# Patient Record
Sex: MALE | Race: BLACK OR AFRICAN AMERICAN | Employment: FULL TIME | URBAN - METROPOLITAN AREA
[De-identification: names, ages, dates, MRNs, and addresses within clinical notes are randomized per-mention and may not be internally consistent; named-entity substitution may affect disease eponyms.]

---

## 2017-07-13 RX ORDER — ATROPINE SULFATE 0.1 MG/ML
0.5 INJECTION INTRAVENOUS
Status: CANCELLED | OUTPATIENT
Start: 2017-07-13 | End: 2017-07-14

## 2017-07-13 RX ORDER — NALOXONE HYDROCHLORIDE 0.4 MG/ML
0.4 INJECTION, SOLUTION INTRAMUSCULAR; INTRAVENOUS; SUBCUTANEOUS
Status: CANCELLED | OUTPATIENT
Start: 2017-07-13 | End: 2017-07-14

## 2017-07-13 RX ORDER — DEXTROMETHORPHAN/PSEUDOEPHED 2.5-7.5/.8
1.2 DROPS ORAL
Status: CANCELLED | OUTPATIENT
Start: 2017-07-13

## 2017-07-13 RX ORDER — SODIUM CHLORIDE 0.9 % (FLUSH) 0.9 %
5-10 SYRINGE (ML) INJECTION AS NEEDED
Status: CANCELLED | OUTPATIENT
Start: 2017-07-13 | End: 2017-07-14

## 2017-07-13 RX ORDER — SODIUM CHLORIDE 0.9 % (FLUSH) 0.9 %
5-10 SYRINGE (ML) INJECTION EVERY 8 HOURS
Status: CANCELLED | OUTPATIENT
Start: 2017-07-13 | End: 2017-07-14

## 2017-07-13 RX ORDER — EPINEPHRINE 0.1 MG/ML
1 INJECTION INTRACARDIAC; INTRAVENOUS
Status: CANCELLED | OUTPATIENT
Start: 2017-07-13 | End: 2017-07-14

## 2017-07-14 ENCOUNTER — HOSPITAL ENCOUNTER (OUTPATIENT)
Age: 62
Setting detail: OUTPATIENT SURGERY
Discharge: HOME | End: 2017-07-14
Attending: SURGERY | Admitting: SURGERY
Payer: COMMERCIAL

## 2017-07-14 VITALS
RESPIRATION RATE: 16 BRPM | OXYGEN SATURATION: 99 % | TEMPERATURE: 95.9 F | HEART RATE: 62 BPM | BODY MASS INDEX: 25.05 KG/M2 | WEIGHT: 175 LBS | HEIGHT: 70 IN | SYSTOLIC BLOOD PRESSURE: 140 MMHG | DIASTOLIC BLOOD PRESSURE: 98 MMHG

## 2017-07-14 PROCEDURE — 77030020263 HC SOL INJ SOD CL0.9% LFCR 1000ML: Performed by: SURGERY

## 2017-07-14 PROCEDURE — 74011250636 HC RX REV CODE- 250/636

## 2017-07-14 PROCEDURE — 76040000019: Performed by: SURGERY

## 2017-07-14 PROCEDURE — 77030009426 HC FCPS BIOP ENDOSC BSC -B: Performed by: SURGERY

## 2017-07-14 PROCEDURE — 99152 MOD SED SAME PHYS/QHP 5/>YRS: CPT | Performed by: SURGERY

## 2017-07-14 PROCEDURE — 74011250636 HC RX REV CODE- 250/636: Performed by: SURGERY

## 2017-07-14 RX ORDER — FENTANYL CITRATE 50 UG/ML
100 INJECTION, SOLUTION INTRAMUSCULAR; INTRAVENOUS
Status: DISCONTINUED | OUTPATIENT
Start: 2017-07-14 | End: 2017-07-14 | Stop reason: HOSPADM

## 2017-07-14 RX ORDER — LISINOPRIL 40 MG/1
40 TABLET ORAL DAILY
COMMUNITY

## 2017-07-14 RX ORDER — SODIUM CHLORIDE 9 MG/ML
125 INJECTION, SOLUTION INTRAVENOUS CONTINUOUS
Status: DISCONTINUED | OUTPATIENT
Start: 2017-07-14 | End: 2017-07-14 | Stop reason: HOSPADM

## 2017-07-14 RX ORDER — MIDAZOLAM HYDROCHLORIDE 1 MG/ML
5 INJECTION, SOLUTION INTRAMUSCULAR; INTRAVENOUS
Status: DISCONTINUED | OUTPATIENT
Start: 2017-07-14 | End: 2017-07-14 | Stop reason: HOSPADM

## 2017-07-14 RX ORDER — LIDOCAINE HYDROCHLORIDE 20 MG/ML
5 SOLUTION OROPHARYNGEAL ONCE
Status: DISCONTINUED | OUTPATIENT
Start: 2017-07-14 | End: 2017-07-14 | Stop reason: HOSPADM

## 2017-07-14 RX ORDER — LISINOPRIL 10 MG/1
10 TABLET ORAL DAILY
Status: ON HOLD | COMMUNITY
End: 2017-07-14

## 2017-07-14 RX ORDER — ATORVASTATIN CALCIUM 20 MG/1
20 TABLET, FILM COATED ORAL DAILY
COMMUNITY

## 2017-07-14 RX ORDER — FLUMAZENIL 0.1 MG/ML
0.2 INJECTION INTRAVENOUS
Status: DISCONTINUED | OUTPATIENT
Start: 2017-07-14 | End: 2017-07-14 | Stop reason: HOSPADM

## 2017-07-14 NOTE — INTERVAL H&P NOTE
H&P Update:  Mariana Trinidad was seen and examined. History and physical has been reviewed. The patient has been examined. There have been no significant clinical changes since the completion of the originally dated History and Physical.  Patient identified by surgeon; surgical site was confirmed by patient and surgeon.     Signed By: Kenya Rod DO     July 14, 2017 8:41 AM

## 2017-07-14 NOTE — PROCEDURES
Colonoscopy Procedure Note    Indications: Screening for colon cancer    Anesthesia/Sedation: MAC anesthesia    Pre-Procedure Exam:  Airway: clear   Heart: normal S1and S2    Lungs: clear bilateral  Abdomen: soft, nontender, bowel sounds present and normal in all quadrants   Mental Status: awake, alert, and oriented to person, place, and time      Procedure in Detail:  Informed consent was obtained for the procedure, including sedation. Risks of perforation, hemorrhage, adverse drug reaction, and aspiration were discussed. The patient was placed in the left lateral decubitus position. Based on the pre-procedure assessment, including review of the patient's medical history, medications, allergies, and review of systems, he had been deemed to be an appropriate candidate for moderate sedation; he was therefore sedated with the medications listed above. The patient was monitored continuously with ECG tracing, pulse oximetry, blood pressure monitoring, and direct observations. A digital rectal examination was performed. The [id number] was inserted into the rectum and advanced under direct vision to the cecum, which was identified by the ileocecal valve and appendiceal orifice. The quality of the colonic preparation was good. A careful inspection of the mucosa was made as the colonoscope was withdrawn, including a careful and complete straight view of the rectum and anorectal junction; findings and interventions are described below. Appropriate photodocumentation was obtained. Findings:   ANUS: Anal exam reveals no masses or hemorrhoids, sphincter tone is normal.   RECTUM: Rectal exam reveals no masses. Internal bleeding hemorrhoids noted. SIGMOID COLON: The mucosa is normal with good vascular pattern and without ulcers, diverticula, and polyps. DESCENDING COLON: The mucosa is normal with good vascular pattern and without ulcers, diverticula, and polyps.    SPLENIC FLEXURE: The splenic flexure is normal. TRANSVERSE COLON: The mucosa is normal with good vascular pattern and without ulcers, diverticula, and polyps. HEPATIC FLEXURE: The hepatic flexure is normal.   ASCENDING COLON: The mucosa is normal with good vascular pattern and without ulcers, diverticula, and polyps. CECUM: The appendiceal orifice appears normal. The ileocecal valve appears normal.   TERMINAL ILEUM: The terminal ileum was not entered. Specimens: No specimens were collected. EBL: None    Complications: None; patient tolerated the procedure well. Recommendations:   - For colon cancer screening in this average-risk patient, colonoscopy may be repeated in 10 years. - Follow up with me. - For rubber band ligation of bleeding internal hemorrhoids    Signed By: DR. Ernestine Angel, [address] [facility]                       7/14/2017

## 2017-07-14 NOTE — H&P
Assessment/Plan  # Detail Type Description    1. Assessment Encounter for screening for cancer of colon (Z12.11). Impression Patient is asymptomatic and due for a screening colonoscopy. .    Patient Plan  Pt is an average risk patient presenting for colonoscopy and colon cancer screening. Pt risk was determined based on NCCN guidelines V1.2016. Risks benefits and alternatives of colonoscopy were discussed. Risk of perforation, bleeding or gas pain, missed polyps, inability to complete full colonoscopy, anesthesia complications discussed. Expectations of preoperative bowel prep /procedure described. Literature was given to the patient and reviewed. The patient showed understanding and agreed with the plan as above. Extended time was taken to answer all patient questions. Extra pre-procedure paperwork and counseling completed. Moviprep prescription and directions provided. Pt has been educated on the Moviprep. Pt has been instructed that he is not to eat legumes, nuts, seeds or vegetables that are high in cellulose one week prior to the planned procedure. Pt should avoid red, purple and orange liquids, janak, and juices. Pt is to be on a clear liquid diet 24 hours prior to the procedure. Pt will start the Moviprep at 1800 hours the night prior to the procedure with the second portion of the prep to be taken 6 hours prior to time of the appointment. This 62year old male presents for Colon Cancer Screen. History of Present Illness:  1. Colon Cancer Screen   Prior screening:  colonoscopy and 2007. Denies risk factors. Associated symptoms include constipation, rectal bleeding and bleeding from hemorrhoids. Pertinent negatives include abdominal pain, diarrhea, nausea, vomiting and weight loss. Additional information: No family history of colon cancer, No family history of Crohn's/colitis and NSAID/ASA use. No CAMRON, no anesthesia problems, prior abd surgeries, no blood thinners. prior fissure surgery -unknown type per patient. No smoker            PROBLEM LIST:  Problem Description Onset Date   Essential hypertension 2011   Pure hypercholesterolemia 2011   Gastroesophageal reflux disease 2011   Proteinuria 2011   Hypogonadism 2015       PAST MEDICAL/SURGICAL HISTORY  (Detailed)    Disease/disorder Onset Date Management Date Comments   Achilles Fixture 2014      Cataracts 2014        Family History:  (Detailed)  Relationship Family Member Name  Age at Death Condition Onset Age Cause of Death   Father  N  Hypertension  N   Mother  N  aortic aneurysm  N       Social History:  (Detailed)  Tobacco use reviewed. Preferred language is [place]. The patient does not need an . MARITAL STATUS/FAMILY/SOCIAL SUPPORT  Currently . CHILDREN  Has children:  1 son(s). 2 daughter(s). Tobacco use status: Never smoked tobacco.  Smoking status: Never smoker. SMOKING STATUS  Use Status Type Smoking Status Usage Per Day Years Used Total Pack Years   no/never  Never smoker          ALCOHOL  There is a history of alcohol use. Type: Beer and liquor. 6 pk of beer consumed weekly. CAFFEINE  The patient does not use caffeine. Patient Status   Completed with information received for patient transitioning into care. Completed with information received for patient in a summary of care record. Medication Reconciliation  Medications reconciled today.   Medication Reviewed  Adherence Medication Name Sig Desc Elsewhere Status   taking as directed Vitamin D 1,000 unit Cap take 2 qd N Verified   taking as directed Naprosyn 500 mg tablet take 1 tablet by oral route 2 times every day with food N Verified   taking as directed ranitidine 150 mg tablet take 1 tablet (150MG)  by oral route 2 times every day N Verified   taking as directed lisinopril 40 mg tablet take 1 tablet (40MG)  by oral route  every day N Verified   taking as directed Lipitor 20 mg tablet take 1 tablet (20MG)  by oral route  every day N Verified   taking as directed Anusol-HC 2.5 % topical cream apply by topical route 2 times every day to the affected area(s) N Verified   taking as directed Axiron 30 mg/actuation (1.5 mL) transderm solution in metered pump apply 1 pump by topical route  every day in the morning to each underarm for a total dose of 60 mg N Verified     Allergies:  Ingredient Reaction Medication Name Comment   CIPROFLOXACIN HCL hives, dyspnea  Cipro   LEVOFLOXACIN hives, dyspnea  Levaquin   CIPROFLOXACIN hives, dyspnea  Cipro   (Reviewed, no changes.)  Review of Systems  System Neg/Pos Details   Constitutional Negative Fever, night sweats and weight loss. ENMT Negative Hearing loss, tinnitus, vertigo and voice change. Eyes Negative Diplopia and vision loss. Respiratory Negative Asthma, cough, dyspnea, hemoptysis, known TB exposure and wheezing. Cardio Negative Chest pain, claudication, edema, irregular heartbeat/palpitations and thrombophlebitis. GI Positive Constipation, Rectal bleeding. GI Negative Abdominal pain, bloating, diarrhea, dysphagia, hemorrhoids, jaundice, nausea, reflux and vomiting.  Negative Dysuria, nocturia, passage stone/gravel and urinary incontinence. Endocrine Negative Cold intolerance and goiter. Neuro Negative Focal weakness, headache, paresthesia, seizures and syncope. Integumentary Negative Change in shape/size of mole(s) and skin lesion. MS Negative Back pain, bone/joint symptoms and muscle weakness. Hema/Lymph Negative Easy bleeding and easy bruising. Allergic/Immuno Negative Contact allergy and contact dermatitis.        Vital Signs     Height  Time ft in cm Last Measured Height Position   1:12 PM 5.0 9.00 175.26 03/29/2017 0     Weight/BSA/BMI  Time lb oz kg Context BMI kg/m2 BSA m2   1:12 .00  82.554 dressed with shoes 26.88      Blood Pressure  Time BP mm/Hg Position Side Site Method Cuff Size   1:12 /97 sitting right arm automatic adult     Temperature/Pulse/Respiration  Time Temp F Temp C Temp Site Pulse/min Pattern Resp/ min   1:12 PM 98.30 36.83 ear 76 regular      Measured By  Time Measured by   1:12 PM Philip Bishop       Physical Exam:  Exam Findings Details   Constitutional Normal Well developed. Eyes Normal Conjunctiva - Right: Normal, Left: Normal. Pupil - Right: Normal, Left: Normal.   Ears Normal Inspection - Right: Normal, Left: Normal.   Nose/Mouth/Throat Normal External nose - Normal. Lips/teeth/gums - Normal.   Neck Exam Normal Inspection - Normal. Thyroid gland - Normal.   Respiratory Normal Inspection - Normal. Auscultation - Normal. Effort - Normal.   Vascular Normal Capillary refill - Less than 2 seconds. Abdomen Normal Inspection - Normal. Auscultation - Normal. No abdominal tenderness. No hepatic enlargement. No spleen enlargement. No hernia. Genitourinary Normal No hernia. Rectal Normal Fecal occult blood test - Not indicated. Skin Normal Inspection - Normal.   Musculoskeletal Normal Visual overview of all four extremities is normal.   Extremity Normal No edema. Neurological Normal Memory - Normal. Cranial nerves - Cranial nerves II through XII grossly intact. Psychiatric Normal Orientation - Oriented to time, place, person & situation. Appropriate mood and affect. Normal insight. Normal judgment.          Medications (added, continued, or stopped this visit):  Started Medication Directions Instruction Stopped   03/07/2017 Anusol-HC 2.5 % topical cream apply by topical route 2 times every day to the affected area(s)     03/07/2017 Axiron 30 mg/actuation (1.5 mL) transderm solution in metered pump apply 1 pump by topical route  every day in the morning to each underarm for a total dose of 60 mg dx hypogonadism    03/07/2017 Lipitor 20 mg tablet take 1 tablet (20MG)  by oral route  every day     03/07/2017 lisinopril 40 mg tablet take 1 tablet (40MG)  by oral route  every day     03/07/2017 Naprosyn 500 mg tablet take 1 tablet by oral route 2 times every day with food     03/07/2017 ranitidine 150 mg tablet take 1 tablet (150MG)  by oral route 2 times every day     02/15/2011 Vitamin D 1,000 unit Cap take 2 qd       Counseling / Educational Factors:  Readiness to learn: accepting. No barriers to learning identified. No learning preferences specified. This is a visit of 45 minutes.  25 minutes were spent counseling.minutes were spent counseling.  -----------------------------------------------------------------------------------------------------

## 2017-07-14 NOTE — IP AVS SNAPSHOT
[place] [address] [address] 
[phone] Patient: Kerry Serrato MRN: [id number] :1955 You are allergic to the following Allergen Reactions Levaquin (Levofloxacin) Anaphylaxis Recent Documentation Height Weight BMI Smoking Status 1.778 m 79.4 kg 25.11 kg/m2 Never Smoker Emergency Contacts Name Discharge Info Relation Home Work Mobile [address] [facility],   [phone] About your hospitalization You were admitted on:  2017 You last received care in the:  [facility] ENDOSCOPY You were discharged on:  2017 Unit phone number:  [phone] Why you were hospitalized Your primary diagnosis was:  Not on File Providers Seen During Your Hospitalizations Provider Role Specialty Primary office phone Rosie Kussmaul, DO Attending Provider Colon and Rectal Surgery [phone] Your Primary Care Physician (PCP) Primary Care Physician Office Phone Office Fax Jamison Pamela [phone] Follow-up Information Follow up With Details Comments Contact Info Rosie Kussmaul, DO  for surgical treatment of hemorrhoids [address] [place] [address] 
[phone] Current Discharge Medication List  
  
CONTINUE these medications which have NOT CHANGED Dose & Instructions Dispensing Information Comments Morning Noon Evening Bedtime LIPITOR 20 mg tablet Generic drug:  atorvastatin Your last dose was: Your next dose is:    
   
   
 Dose:  20 mg Take 20 mg by mouth daily. Refills:  0  
     
   
   
   
  
 lisinopril 40 mg tablet Commonly known as:  Ross Peek Your last dose was: Your next dose is:    
   
   
 Dose:  40 mg Take 40 mg by mouth daily. Refills:  0 Discharge Instructions Steven November [id number] 
1955 COLON DISCHARGE INSTRUCTIONS Discomfort: 
Redness at IV site- apply warm compress to area; if redness or soreness persist- contact your physician There may be a slight amount of blood passed from the rectum Gaseous discomfort- walking, belching will help relieve any discomfort You should not operate a vehicle for 12 hours You should not engage in an occupation involving machinery or appliances for rest of today You may not drink alcoholic beverages for at least 12 hours Avoid making any critical decisions for at least 24 hour DIET: 
 Regular or resume diet normally appropriate for you.  however -  remember your colon is empty and a heavy meal will produce gas. Avoid these foods:  vegetables, fried / greasy foods, carbonated drinks for today ACTIVITY: 
You may resume your normal daily activities it is recommended that you spend the remainder of the day resting -  avoid any strenuous activity. CALL M.D. ANY SIGN OF: Increasing pain, nausea, vomiting Abdominal distension (swelling) New increased bleeding (oral or rectal) Fever (chills) Pain in chest area Bloody discharge from nose or mouth Shortness of breath Follow-up Instructions: 
 If biopsies were taken, please call Dr. Syd Jasso office in next 2 weeks, [phone]. Otherwise follow up with your PCM. Steven November [id number] 
1955 DISCHARGE SUMMARY from Nurse The following personal items collected during your admission are returned to you:  
Dental Appliance:   
Vision:   
Hearing Aid:   
Jewelry:   
Clothing:   
Other Valuables:   
Valuables sent to safe:   
 
 
 
DISCHARGE SUMMARY from Nurse The following personal items collected during your admission are returned to you:  
Dental Appliance: Dental Appliances: None Vision: Visual Aid: Glasses (reading) Hearing Aid:   
Jewelry:   
Clothing:   
Other Valuables: Valuables sent to safe:   
 
 
 
 
 
PATIENT INSTRUCTIONS: 
 
 
F-face looks uneven A-arms unable to move or move unevenly S-speech slurred or non-existent T-time-call 911 as soon as signs and symptoms begin-DO NOT go Back to bed or wait to see if you get better-TIME IS BRAIN. The discharge information has been reviewed with the patient and spouse. The patient and spouse verbalized understanding. Warning Signs of HEART ATTACK Call 911 if you have these symptoms: 
? Chest discomfort. Most heart attacks involve discomfort in the center of the chest that lasts more than a few minutes, or that goes away and comes back. It can feel like uncomfortable pressure, squeezing, fullness, or pain. ? Discomfort in other areas of the upper body. Symptoms can include pain or discomfort in one or both arms, the back, neck, jaw, or stomach. ? Shortness of breath with or without chest discomfort. ? Other signs may include breaking out in a cold sweat, nausea, or lightheadedness. Don't wait more than five minutes to call [address]! Fast action can save your life. Calling 911 is almost always the fastest way to get lifesaving treatment. Emergency Medical Services staff can begin treatment when they arrive  up to an hour sooner than if someone gets to the hospital by car. The discharge information has been reviewed with the patient and caregiver. The patient and caregiver verbalized understanding. Discharge medications reviewed with the patient and guardian and appropriate educational materials and side effects teaching were provided. Patient armband removed and shredded Discharge Instructions Attachments/References COLONOSCOPY : POST-OP (ENGLISH) HEMORRHOIDS (ENGLISH) HEMORRHOIDS: RUBBER BAND LIGATION: PRE-OP (ENGLISH) Discharge Orders None Introducing [facility] SERVICES! Elle Rangel introduces [employer] patient portal. Now you can access parts of your medical record, email your doctor's office, and request medication refills online. 1. In your internet browser, go to [email or web address] 2. Click on the First Time User? Click Here link in the Sign In box. You will see the New Member Sign Up page. 3. Enter your [employer] Access Code exactly as it appears below. You will not need to use this code after youve completed the sign-up process. If you do not sign up before the expiration date, you must request a new code. · [employer] Access Code: [email or web address] Expires: 9/26/2017  9:18 AM 
 
4. Enter the last four digits of your Social Security Number (xxxx) and Date of Birth (mm/dd/yyyy) as indicated and click Submit. You will be taken to the next sign-up page. 5. Create a [employer] ID. This will be your [employer] login ID and cannot be changed, so think of one that is secure and easy to remember. 6. Create a [employer] password. You can change your password at any time. 7. Enter your Password Reset Question and Answer. This can be used at a later time if you forget your password. 8. Enter your e-mail address. You will receive e-mail notification when new information is available in [address] 9. Click Sign Up. You can now view and download portions of your medical record. 10. Click the Download Summary menu link to download a portable copy of your medical information. If you have questions, please visit the Frequently Asked Questions section of the [employer] website. Remember, [employer] is NOT to be used for urgent needs. For medical emergencies, dial 911. Now available from your iPhone and Android! General Information Please provide this summary of care documentation to your next provider. Patient Signature:  ____________________________________________________________ Date:  ____________________________________________________________  
  
Kely Maryann Provider Signature:  ____________________________________________________________ Date:  ____________________________________________________________ More Information Colonoscopy: What to Expect at [facility] Your Recovery After you have a colonoscopy, you will stay at the clinic for 1 to 2 hours until the medicines wear off. Then you can go home. But you will need to arrange for a ride. Your doctor will tell you when you can eat and do your other usual activities. Your doctor will talk to you about when you will need your next colonoscopy. Your doctor can help you decide how often you need to be checked. This will depend on the results of your test and your risk for colorectal cancer. After the test, you may be bloated or have gas pains. You may need to pass gas. If a biopsy was done or a polyp was removed, you may have streaks of blood in your stool (feces) for a few days. This care sheet gives you a general idea about how long it will take for you to recover. But each person recovers at a different pace. Follow the steps below to get better as quickly as possible. How can you care for yourself at home? Activity · Rest when you feel tired. · You can do your normal activities when it feels okay to do so. Diet · Follow your doctor's directions for eating. · Unless your doctor has told you not to, drink plenty of fluids. This helps to replace the fluids that were lost during the colon prep. · Do not drink alcohol. Medicines · Your doctor will tell you if and when you can restart your medicines. He or she will also give you instructions about taking any new medicines. · If you take blood thinners, such as warfarin (Coumadin), clopidogrel (Plavix), or aspirin, be sure to talk to your doctor. He or she will tell you if and when to start taking those medicines again. Make sure that you understand exactly what your doctor wants you to do. · If polyps were removed or a biopsy was done during the test, your doctor may tell you not to take aspirin or other anti-inflammatory medicines for a few days. These include ibuprofen (Advil, Motrin) and naproxen (Aleve). Other instructions · For your safety, do not drive or operate machinery until the medicine wears off and you can think clearly. Your doctor may tell you not to drive or operate machinery until the day after your test. 
· Do not sign legal documents or make major decisions until the medicine wears off and you can think clearly. The anesthesia can make it hard for you to fully understand what you are agreeing to. Follow-up care is a key part of your treatment and safety. Be sure to make and go to all appointments, and call your doctor if you are having problems. It's also a good idea to know your test results and keep a list of the medicines you take. When should you call for help? Call 911 anytime you think you may need emergency care. For example, call if: 
· You passed out (lost consciousness). · You pass maroon or bloody stools. · You have severe belly pain. Call your doctor now or seek immediate medical care if: 
· Your stools are black and tarlike. · Your stools have streaks of blood, but you did not have a biopsy or any polyps removed. · You have belly pain, or your belly is swollen and firm. · You vomit. · You have a fever. · You are very dizzy. Watch closely for changes in your health, and be sure to contact your doctor if you have any problems. Where can you learn more? Go to http://keerthi-clara.info/.  
Enter E264 in the search box to learn more about \"Colonoscopy: What to Expect at Home. \" Current as of: August 9, 2016 Content Version: 11.3 © 5737-9544 [employer]. Care instructions adapted under license by [employer] (which disclaims liability or warranty for this information). If you have questions about a medical condition or this instruction, always ask your healthcare professional. [address] any warranty or liability for your use of this information. Hemorrhoids: Care Instructions Your Care Instructions Hemorrhoids are enlarged veins that develop in the anal canal. Bleeding during bowel movements, itching, swelling, and rectal pain are the most common symptoms. They can be uncomfortable at times, but hemorrhoids rarely are a serious problem. You can treat most hemorrhoids with simple changes to your diet and bowel habits. These changes include eating more fiber and not straining to pass stools. Most hemorrhoids do not need surgery or other treatment unless they are very large and painful or bleed a lot. Follow-up care is a key part of your treatment and safety. Be sure to make and go to all appointments, and call your doctor if you are having problems. Its also a good idea to know your test results and keep a list of the medicines you take. How can you care for yourself at home? · Sit in a few inches of warm water (sitz bath) 3 times a day and after bowel movements. The warm water helps with pain and itching. · Put ice on your anal area several times a day for 10 minutes at a time. Put a thin cloth between the ice and your skin. Follow this by placing a warm, wet towel on the area for another 10 to 20 minutes. · Take pain medicines exactly as directed. ¨ If the doctor gave you a prescription medicine for pain, take it as prescribed. ¨ If you are not taking a prescription pain medicine, ask your doctor if you can take an over-the-counter medicine. · Keep the anal area clean, but be gentle. Use water and a fragrance-free soap, such as [place], or use baby wipes or medicated pads, such as Tucks. · Wear cotton underwear and loose clothing to decrease moisture in the anal area. · Eat more fiber. Include foods such as whole-grain breads and cereals, raw vegetables, raw and dried fruits, and beans. · Drink plenty of fluids, enough so that your urine is light yellow or clear like water. If you have kidney, heart, or liver disease and have to limit fluids, talk with your doctor before you increase the amount of fluids you drink. · Use a stool softener that contains bran or psyllium. You can save money by buying bran or psyllium (available in bulk at most health food stores) and sprinkling it on foods or stirring it into fruit juice. Or you can use a product such as Metamucil or Hydrocil. · Practice healthy bowel habits. ¨ Go to the bathroom as soon as you have the urge. ¨ Avoid straining to pass stools. Relax and give yourself time to let things happen naturally. ¨ Do not hold your breath while passing stools. ¨ Do not read while sitting on the toilet. Get off the toilet as soon as you have finished. · Take your medicines exactly as prescribed. Call your doctor if you think you are having a problem with your medicine. When should you call for help? Call 911 anytime you think you may need emergency care. For example, call if: 
· You pass maroon or very bloody stools. Call your doctor now or seek immediate medical care if: 
· You have increased pain. · You have increased bleeding. Watch closely for changes in your health, and be sure to contact your doctor if: 
· Your symptoms have not improved after 3 or 4 days. Where can you learn more? Go to http://keerthi-clara.info/. Enter F228 in the search box to learn more about \"Hemorrhoids: Care Instructions. \" Current as of: August 9, 2016 Content Version: 11.3 © 5130-1529 [employer]. Care instructions adapted under license by [employer] (which disclaims liability or warranty for this information). If you have questions about a medical condition or this instruction, always ask your healthcare professional. [address] any warranty or liability for your use of this information. Rubber Band Ligation for Hemorrhoids: Before Your Procedure What is rubber band ligation? Rubber band ligation treats hemorrhoids. Hemorrhoids are swollen veins in the rectal area. This treatment is for only internal hemorrhoids. To do the procedure, your doctor puts a special viewing tool into your anus. This tool is called an anoscope. The doctor then uses other tools to grab the hemorrhoid and put a rubber band around it. The band stops the blood flow. This causes the hemorrhoids to shrink and fall off in 7 to 10 days. In most cases, this procedure is done in the doctor's office. Your doctor can treat one or two hemorrhoids at a time. More hemorrhoids can be treated if you are asleep during the procedure. The procedure takes about 30 minutes. You can go home when it's done. Some people are able to return to regular activities right away. Others may need to take a few days off from work. Make sure not to lift anything heavy until you heal. It's also important not to strain when you have a bowel movement. Follow-up care is a key part of your treatment and safety. Be sure to make and go to all appointments, and call your doctor if you are having problems. It's also a good idea to know your test results and keep a list of the medicines you take. What happens before the procedure? Procedures can be stressful. This information will help you understand what you can expect. And it will help you safely prepare for your procedure. Preparing for the procedure · Understand exactly what procedure is planned, along with the risks, benefits, and other options. · Tell your doctors ALL the medicines, vitamins, supplements, and herbal remedies you take. Some of these can increase the risk of bleeding or interact with anesthesia. · If you take blood thinners, such as warfarin (Coumadin), clopidogrel (Plavix), or aspirin, be sure to talk to your doctor. He or she will tell you if you should stop taking these medicines before your procedure. Make sure that you understand exactly what your doctor wants you to do. · Your doctor will tell you which medicines to take or stop before your procedure. You may need to stop taking certain medicines a week or more before the procedure. So talk to your doctor as soon as you can. · If you have an advance directive, let your doctor know. It may include a living will and a durable power of  for health care. Bring a copy to the hospital. If you don't have one, you may want to prepare one. It lets your doctor and loved ones know your health care wishes. Doctors advise that everyone prepare these papers before any type of surgery or procedure. · You may need to empty your colon with an enema or laxative. Your doctor will tell you how to do this. What happens on the day of the procedure? · Follow the instructions exactly about when to stop eating and drinking. If you don't, your procedure may be canceled. If your doctor told you to take your medicines on the day of the procedure, take them with only a sip of water. · Take a bath or shower before you come in for your procedure. Do not apply lotions, perfumes, deodorants, or nail polish. · Take off all jewelry and piercings. And take out contact lenses, if you wear them. At the doctor's office · Bring a picture ID. · You will be kept comfortable and safe by your anesthesia provider. · The procedure will take about 30 minutes. · You will be able to go home right after the procedure. Going home · Be sure you have someone to drive you home. Anesthesia and pain medicine make it unsafe for you to drive. · You will be given more specific instructions about recovering from your procedure. They will cover things like diet, wound care, follow-up care, driving, and getting back to your normal routine. When should you call your doctor? · You have questions or concerns. · You don't understand how to prepare for your procedure. · You become ill before the procedure (such as fever, flu, or a cold). · You need to reschedule or have changed your mind about having the procedure. Where can you learn more? Go to http://keerthi-clara.info/. Enter Y089 in the search box to learn more about \"Rubber Band Ligation for Hemorrhoids: Before Your Procedure. \" Current as of: August 9, 2016 Content Version: 11.3 © 7993-0172 [employer]. Care instructions adapted under license by [employer] (which disclaims liability or warranty for this information). If you have questions about a medical condition or this instruction, always ask your healthcare professional. [address] any warranty or liability for your use of this information.

## 2017-09-28 ENCOUNTER — HOSPITAL ENCOUNTER (OUTPATIENT)
Dept: PREADMISSION TESTING | Age: 62
Discharge: HOME | End: 2017-09-28
Payer: COMMERCIAL

## 2017-09-28 DIAGNOSIS — K64.2 THIRD DEGREE HEMORRHOIDS: ICD-10-CM

## 2017-09-28 LAB
HCT VFR BLD AUTO: 42.7 % (ref 36–48)
HGB BLD-MCNC: 14.7 G/DL (ref 13–16)
POTASSIUM SERPL-SCNC: 4.8 MMOL/L (ref 3.5–5.5)

## 2017-09-28 PROCEDURE — 36415 COLL VENOUS BLD VENIPUNCTURE: CPT | Performed by: SURGERY

## 2017-09-28 PROCEDURE — 93005 ELECTROCARDIOGRAM TRACING: CPT

## 2017-09-28 PROCEDURE — 85018 HEMOGLOBIN: CPT | Performed by: SURGERY

## 2017-09-28 PROCEDURE — 84132 ASSAY OF SERUM POTASSIUM: CPT | Performed by: SURGERY

## 2017-09-29 LAB
ATRIAL RATE: 65 BPM
CALCULATED P AXIS, ECG09: 58 DEGREES
CALCULATED R AXIS, ECG10: 64 DEGREES
CALCULATED T AXIS, ECG11: 68 DEGREES
DIAGNOSIS, 93000: NORMAL
FAX TO INFO,FAXT: NORMAL
FAX TO NUMBER,FAXN: NORMAL
P-R INTERVAL, ECG05: 162 MS
Q-T INTERVAL, ECG07: 382 MS
QRS DURATION, ECG06: 76 MS
QTC CALCULATION (BEZET), ECG08: 397 MS
VENTRICULAR RATE, ECG03: 65 BPM

## 2017-11-01 ENCOUNTER — HOSPITAL ENCOUNTER (OUTPATIENT)
Dept: PHYSICAL THERAPY | Age: 62
Discharge: HOME | End: 2017-11-01
Payer: COMMERCIAL

## 2017-11-01 PROCEDURE — 97530 THERAPEUTIC ACTIVITIES: CPT

## 2017-11-01 PROCEDURE — 97162 PT EVAL MOD COMPLEX 30 MIN: CPT

## 2017-11-01 NOTE — PROGRESS NOTES
[facility] at [address] [place] [address]  Phone: [phone]   Fax: [phone]      Plan of Care/ Statement of Necessity for Physical Therapy Services    Patient name: Osman Quinn Start of Care: 2017   Referral source: Chuyita Hurst DO : 1955    Medical Diagnosis: Constipation [K59.00]   Onset Date:    Treatment Diagnosis: Constipation   Prior Hospitalization: see medical history Provider#: [id number]   Medications: Verified on Patient summary List    Comorbidities: hemorrhoids   Prior Level of Function: patient was independent with all ADLs and activities however now has decreased quality of life secondary to bowel function/pain         The Plan of Care and following information is based on the information from the initial evaluation. Assessment/ key information:   Pt is a 63 yo M who presents with c/o constipation and pelvic pain following rubber band ligation one month ago. He has inconsistent pain which is rated 0/10 today, 0/10 at best, and 9/10 at worst.  Pt reports 3-4X/ bowel movements per week, with usually type III-IV stool consistency. He is currently taking metamucil and miralax about 2X/day. Straining is inconsistent but better with stool softeners currently being taken. Patient denies any bladder issues. Pelvic floor muscle evaluation reveals pt with anal wink intact, skin integrity healthy, large hemorrhoid with noted redness, limited motility with external examination of PFM contraction and release, and poor coordination especially with eccentric lengthening of PFM. SEMG assessment deferred at this time and for completion upon follow-up visit. Pt may benefit from physical therapy to address pelvic floor dysfunction with symptoms of constipation following recent surgery.      Evaluation Complexity History MEDIUM  Complexity : 1-2 comorbidities / personal factors will impact the outcome/ POC ; Examination MEDIUM Complexity : 3 Standardized tests and measures addressing body structure, function, activity limitation and / or participation in recreation  ;Presentation MEDIUM Complexity : Evolving with changing characteristics  ; Clinical Decision Making MEDIUM Complexity : FOTO score of 26-74  Overall Complexity Rating: MEDIUM    Problem List: Pelvic pain/dysfunction, Decreased pelvic floor mm awareness, Decreased pelvic floor mm strength, Use of accessory muscles, Improper voiding habits, Hypertonus of pelvic floor, Urinary urgency and Othercolorectal     Treatment Plan may include any combination of the following:   Therapeutic exercise, Urge suppression techniques, Neuromuscular re-education, Manual therapy, Physical agent/modality, Patient education and OtherSEMG/biofeedback, and electrical stimulation  Patient / Family readiness to learn indicated by: asking questions, trying to perform skills and interest    Persons(s) to be included in education: patient (P)    Barriers to Learning/Limitations: None    Patient Goal (s): check condition    Patient Self Reported Health Status: good    Rehabilitation Potential: good    Short Term Goals: To be accomplished in 5  weeks:  1. Patient will perform Home Exercise Program accurately as adjunct to PT clinic visits to promote healthy lifestyle and improve quality of life. Status @ Eval: to be initiated 2nd visit  2. Patient will demonstrate accurate simulation of proper defecation dynamics with min cues for increased ease of defecation and more normal function. Status @ eval: discussed benefits of squatty potty    Long Term Goals: To be accomplished in 8 weeks:  1. Patient demonstrate independence in HEP for maintenance of Pelvic Floor program and improved quality of life. Status @ Eval: to be initiated 2nd visit  2.  Patient will report bowel regularity no more than 3X/day with minimal to no straining for absence of constipation/decreased abdominal pain and ability to return to pain free ADL activity. Status @ Eval: 3-4x/day with inconsistent straining  3. Patient will demonstrate correct coordination of pelvic floor and abdominal musculature for proper defecation dynamics independently to facilitate more normal defecation/evacuation. Status @ Eval: to be reviewed  4. Patient will have FOTO score Bowel Constipation change of 12 points indicating improvement in function. Status @ Eval: 47    Frequency / Duration: Patient to be seen 2 times per week for 8 weeks. Patient/ Caregiver education and instruction: Diagnosis, prognosis, Other Bowel log   [x]  Plan of care has been reviewed with ESTELLA Melvin 11/1/2017 3:07 PM    ________________________________________________________________________    I certify that the above Therapy Services are being furnished while the patient is under my care. I agree with the treatment plan and certify that this therapy is necessary.     Physician's Signature:___________________  Date:____________Time: _________    Please sign and return to [facility] at [address] [place] [address]     Phone: [phone]   Fax: [phone]

## 2017-11-01 NOTE — PROGRESS NOTES
PF EVALUATION/TREATMENT NOTE     Patient Name: Reji Vaughan  Date:2017  : 1955  [x]  Patient  Verified  Payor: BLUE CROSS / Plan: [address] [place] [address] / Product Type: PPO /    In time:03:00  Out time:03:55  Total Treatment Time (min): 54  Visit #: 1 of 16    Treatment Area: [x] Pelvic Floor     [] Other:    SUBJECTIVE  Any medication changes, allergies to medications, adverse drug reactions, diagnosis change, or new procedure performed?: [x] No    [] Yes (see summary sheet for update)    Pain Level : 0/10    OBJECTIVE     Pelvic Floor Dysfunction Evaluation  Internal examination held secondary to recent surgery, external examination performed please refer to POC    Musculoskeletal Screen:  Skin Integrity:  [] Healthy [] Red  [] Labia Atrophy [] Fragile    Sensation: [] Intact [] Diminished:    Muscle Bulk: [] Symmetrical  [] Well-developed [] Atrophied:  []L   []R   []B    Prolapse: [] Cystocele:   [] Rectocele:    PERF Score (Performance/Endurance/Repetitions/Flicks)    Accessory Muscle Use:     Patient has failed previous pelvic floor muscle training? [] Yes    [] No       45 min []Eval                  []Re-Eval       10 min Therapeutic Activity:  []  See flow sheet :    []  Increase Tissue extensibility        []  Assess fiber intake    []  Assess voiding habits  []  Assess bowel habits  []  Other:reviewed bowel log and demonstrated proper toileting positioning utilizing squatty potty   Rationale: increase awareness  to improve the patients ability to have easier bowel movements    Other Objective/Functional Measures: FOTO: 47    Pain Level (0-10 scale) post treatment: 0/10    ASSESSMENT:   Pt is a 63 yo M who presents with c/o constipation and pelvic pain following rubber band ligation one month ago. He has inconsistent pain which is rated 0/10 today, 0/10 at best, and 9/10 at worst.  Pt reports 3-4X/ bowel movements per week, with usually type III-IV stool consistency.   He is currently taking metamucil and miralax about 2X/day. Straining is inconsistent but better with stool softeners currently being taken. Patient denies any bladder issues. Pelvic floor muscle evaluation reveals pt with anal wink intact, skin integrity healthy, large hemorrhoid with noted redness, limited motility with external examination of PFM contraction and release, and poor coordination especially with eccentric lengthening of PFM. SEMG assessment deferred at this time and for completion upon follow-up visit. Pt may benefit from physical therapy to address pelvic floor dysfunction with symptoms of constipation following recent surgery.      [x]  See Plan of Care  []  See progress note/recertification  []  See Discharge Summary         Progress towards goals / Updated goals:  SEE POC    PLAN  []  Upgrade activities as tolerated     [x]  Continue plan of care  []  Update interventions per flow sheet       []  Discharge due to:_  []  Other:_      Dennise Melo 11/1/2017  3:07 PM

## 2017-11-02 ENCOUNTER — HOSPITAL ENCOUNTER (OUTPATIENT)
Dept: PHYSICAL THERAPY | Age: 62
Discharge: HOME | End: 2017-11-02
Payer: COMMERCIAL

## 2017-11-02 PROCEDURE — 97112 NEUROMUSCULAR REEDUCATION: CPT

## 2017-11-02 PROCEDURE — 97530 THERAPEUTIC ACTIVITIES: CPT

## 2017-11-02 NOTE — PROGRESS NOTES
PF DAILY TREATMENT NOTE 3-16    Patient Name: Denae Amador  Date:2017  : 1955  [x]  Patient  Verified  Payor: BLUE CROSS / Plan: [address] [place] [address] / Product Type: PPO /    In time:230  Out time:315  Total Treatment Time (min): 45  Total Timed Codes (min): 45  1:1 Treatment Time (MC only):     Visit #: 2 of 16    Treatment Area: [x] Pelvic Floor     [] Other:    SUBJECTIVE  Pain Level (0-10 scale): 0/10  Any medication changes, allergies to medications, adverse drug reactions, diagnosis change, or new procedure performed?: [x] No    [] Yes (see summary sheet for update)  Subjective functional status/changes:   [] No changes reported  Pt reports that he has modified his diet since his surgery eating less meat and lighter foods as he finds it's easier to empty his bowels. He states that he is also reliant on taking stool softeners and metamucil to ensure ease of bowel movement.   He states  OBJECTIVE            30 min Therapeutic Activity:  []  See flow sheet :    [x]  Increase Tissue extensibility        [x]  Assess fiber intake    [x]  Assess voiding habits  [x]  Assess bowel habits  [x]  Other:constipation management strategies, defecation dynamics, digestion   Rationale: increase ROM, improve coordination and increase proprioception  to improve the patients ability to empty bowels easily      15 min Neuromuscular Re-education:  []  See flow sheet :   []  Pelvic floor strengthening                 [x]  Pelvic floor downtraining  []  Quality pelvic floor contractions       [x]  Relaxation techniques  []  Urge suppression exercises  [x]  Other:diaphragmatic breathing, groin stretch/adductor stretch  Rationale: increase ROM, improve coordination and increase proprioception  to improve the patients ability to empty bowels easily          With   [] TE   [x] TA   [x] neuro  [] manual   [] other: Patient Education: [x] Review HEP    [] Progressed/Changed HEP based on:   [] positioning   [] body mechanics   [] transfers   [] heat/ice application    [x] other: probiotics, reduce alcohol and increase water consumption     Other Objective/Functional Measures:   []    Pain Level (0-10 scale) post treatment: 0/10    ASSESSMENT/Changes in Function: Pt demonstrates overconsumption of bladder irritants and limited vegetables and probiotics contributing to difficulty voiding. Pt educated extensively and encouraged to reduce alcohol, increase water, start probiotics and increase vegetables to promote improved ease of movement. Pt returned demonstration of diaphragmatic breathing and adductor stretch to promote improved pelvic floor mobility. []  Decrease # of leaks   [] No change []  Improving [] Resolved     []  Decrease hypertonus [] No change []  Improving [] Resolved     []  Increase void interval [] No change []  Improving [] Resolved     []  Increase PF strength [] No change []  Improving [] Resolved     []  Increase PF endurance [] No change []  Improving [] Resolved     []  Increase endurance [] No change []  Improving [] Resolved     []  Decrease # of pads [] No change []  Improving [] Resolved     []  Decrease pain [] No change []  Improving [] Resolved     []  Increased coordination [] No change []  Improving [] Resolved     []  Increased Bowel Frequency [] No change []  Improving [] Resolved       Patient will continue to benefit from skilled PT services to address functional mobility deficits, address ROM deficits, address strength deficits, analyze and address soft tissue restrictions and instruct in home and community integration to attain remaining goals. []  See Plan of Care  []  See progress note/recertification  []  See Discharge Summary         Progress towards goals / Updated goals:  Short Term Goals: To be accomplished in 5  weeks:  1. Patient will perform Home Exercise Program accurately as adjunct to PT clinic visits to promote healthy lifestyle and improve quality of life. Status @ Eval: to be initiated 2nd visit  Current: Pt educated on constipation management strategies. Will utilize biofeedback at next visit. 2. Patient will demonstrate accurate simulation of proper defecation dynamics with min cues for increased ease of defecation and more normal function. Status @ eval: discussed benefits of squatty potty  Current: discussed absence of straining and blowing dandelion petals     Long Term Goals: To be accomplished in 8 weeks:  1. Patient demonstrate independence in [facility] for maintenance of Pelvic Floor program and improved quality of life. Status @ Eval: to be initiated 2nd visit  2. Patient will report bowel regularity no more than 3X/day with minimal to no straining for absence of constipation/decreased abdominal pain and ability to return to pain free ADL activity. Status @ Eval: 3-4x/day with inconsistent straining  3. Patient will demonstrate correct coordination of pelvic floor and abdominal musculature for proper defecation dynamics independently to facilitate more normal defecation/evacuation. Status @ Eval: to be reviewed  4. Patient will have FOTO score Bowel Constipation change of 12 points indicating improvement in function.   Status @ Eval: 52    PLAN  []  Upgrade activities as tolerated     []  Continue plan of care  []  Update interventions per flow sheet       []  Discharge due to:_  []  Other:_      Marek Quigley PTA 11/2/2017  2:30 PM    Future Appointments  Date Time Provider Rosalie Espinal   11/9/2017 2:30 PM Matt MICHEL [facility]   11/10/2017 10:45 AM ESTELLA PerazaHPTNIDIA [facility]   11/15/2017 3:00 PM ESTELLA PerazaHPTNIDIA [facility]   11/17/2017 9:15 AM ESTELLA PerazaHPTNIDIA [facility]   11/20/2017 9:15 AM Rebeca MICHEL [facility]   11/22/2017 3:00 PM ESTELLA PerazaHPTNIDIA [facility]   11/27/2017 10:00 AM ESTELLA PerazaHPTNIDIA [facility]   11/29/2017 3:15 PM ESTELLA PerazaHPTNIDIA [facility]

## 2017-11-06 ENCOUNTER — HOSPITAL ENCOUNTER (OUTPATIENT)
Dept: PHYSICAL THERAPY | Age: 62
Discharge: HOME | End: 2017-11-06
Payer: COMMERCIAL

## 2017-11-06 PROCEDURE — 97112 NEUROMUSCULAR REEDUCATION: CPT

## 2017-11-06 PROCEDURE — 97530 THERAPEUTIC ACTIVITIES: CPT

## 2017-11-06 NOTE — PROGRESS NOTES
PF DAILY TREATMENT NOTE 3    Patient Name: Melvi Query  Date:2017  : 1955  [x]  Patient  Verified  Payor: BLUE CROSS / Plan: [address] [place] [address] / Product Type: PPO /    In time:830  Out time:915  Total Treatment Time (min): 45  Total Timed Codes (min): 45  1:1 Treatment Time (MC only):     Visit #: 3  16    Treatment Area: [x] Pelvic Floor     [] Other:    SUBJECTIVE  Pain Level (0-10 scale): 0/10  Any medication changes, allergies to medications, adverse drug reactions, diagnosis change, or new procedure performed?: [x] No    [] Yes (see summary sheet for update)  Subjective functional status/changes:   [] No changes reported  Pt reports that he drank 64 oz of water. He notes that he may have emptied a little better and with less strain.     OBJECTIVE           15 min Therapeutic Activity:  [x]  See flow sheet :    [x]  Increase Tissue extensibility        [x]  Assess fiber intake    [x]  Assess voiding habits  [x]  Assess bowel habits  [x]  Other:defecation dynamics, relaxed voiding   Rationale: increase ROM, improve coordination and increase proprioception  to improve the patients ability to improved emptying of bowels      30 min Neuromuscular Re-education:  [x]  See flow sheet :   [x]  Pelvic floor strengthening                 [x]  Pelvic floor downtraining  [x]  Quality pelvic floor contractions       [x]  Relaxation techniques  []  Urge suppression exercises  [x]  Other:biofeedback, diaphragmatic breathing and restorative yoga  Rationale: increase ROM, increase strength, improve coordination and increase proprioception  to improve the patients ability to improve bowel emptying          With   [] TE   [x] TA   [x] neuro  [] manual   [] other: Patient Education: [x] Review HEP    [] Progressed/Changed HEP based on:   [] positioning   [] body mechanics   [] transfers   [] heat/ice application    [x] other: kegels and restorative yoga to practice     Other Objective/Functional Measures:   []baseline resting tone: .5 mv  []slow twitch mms2.8 mv      Pain Level (0-10 scale) post treatment: 0/10    ASSESSMENT/Changes in Function: Pt demonstrates limited hip mobility contributing to ongoing ms tension in pelvic floor. He does however demonstrate good relaxation during biofeedback with limited height for PF exercises. Pt provided with handout with restorative yoga poses to promote pelvic floor mobility as well as pelvic floor exercises to help promote improved proprioception. []  Decrease # of leaks   [] No change []  Improving [] Resolved     []  Decrease hypertonus [] No change []  Improving [] Resolved     []  Increase void interval [] No change []  Improving [] Resolved     []  Increase PF strength [] No change []  Improving [] Resolved     []  Increase PF endurance [] No change []  Improving [] Resolved     []  Increase endurance [] No change []  Improving [] Resolved     []  Decrease # of pads [] No change []  Improving [] Resolved     [x]  Decrease pain [] No change [x]  Improving [] Resolved     [x]  Increased coordination [] No change [x]  Improving [] Resolved     []  Increased Bowel Frequency [] No change []  Improving [] Resolved       Patient will continue to benefit from skilled PT services to address functional mobility deficits, address ROM deficits, address strength deficits and analyze and address soft tissue restrictions to attain remaining goals. []  See Plan of Care  []  See progress note/recertification  []  See Discharge Summary         Progress towards goals / Updated goals:  Short Term Goals: To be accomplished in 5  weeks:  1. Patient will perform Home Exercise Program accurately as adjunct to PT clinic visits to promote healthy lifestyle and improve quality of life. Status @ Eval: to be initiated 2nd visit  Current: PROGRESSING and compliant  2.  Patient will demonstrate accurate simulation of proper defecation dynamics with min cues for increased ease of defecation and more normal function.    Status @ eval: discussed benefits of squatty potty  Current: Progressing: Practiced relaxed voiding      Long Term Goals: To be accomplished in 8 weeks:  1. Patient demonstrate independence in HEP for maintenance of Pelvic Floor program and improved quality of life. Status @ Eval: to be initiated 2nd visit  Current: Modified nutrition  2. Patient will report bowel regularity no more than 3X/day with minimal to no straining for absence of constipation/decreased abdominal pain and ability to return to pain free ADL activity. Status @ Eval: 3-4x/day with inconsistent straining  Current: Progressing, less straining and improved emptying  3. Patient will demonstrate correct coordination of pelvic floor and abdominal musculature for proper defecation dynamics independently to facilitate more normal defecation/evacuation. Status @ Eval: to be reviewed  CUrrent: Progressing, discussed defecation dynamics and utilized biofeedback  4. Patient will have FOTO score Bowel Constipation change of 12 points indicating improvement in function.   Status @ Eval: 52       PLAN  []  Upgrade activities as tolerated     [x]  Continue plan of care  []  Update interventions per flow sheet       []  Discharge due to:_  []  Other:_      Larissa Serrano PTA 11/6/2017  8:32 AM    Future Appointments  Date Time Provider Rosalie Espinal   11/9/2017 2:30 PM Laverna Regulus, PTA [facility]   11/15/2017 3:00 PM Laverna Regulus, PTA [facility]   11/17/2017 9:15 AM Laverna Regulus, PTA [facility]   11/20/2017 9:15 AM [place] [facility]   11/22/2017 3:00 PM Laverna Regulus, PTA [facility]   11/27/2017 10:00 AM Laverna Regulus, PTA [facility]   11/29/2017 3:15 PM Laverna Regulus, PTA [facility]

## 2017-11-09 ENCOUNTER — HOSPITAL ENCOUNTER (OUTPATIENT)
Dept: PHYSICAL THERAPY | Age: 62
Discharge: HOME | End: 2017-11-09
Payer: COMMERCIAL

## 2017-11-09 PROCEDURE — 97530 THERAPEUTIC ACTIVITIES: CPT

## 2017-11-09 PROCEDURE — 97140 MANUAL THERAPY 1/> REGIONS: CPT

## 2017-11-09 NOTE — PROGRESS NOTES
PF DAILY TREATMENT NOTE 3    Patient Name: Sanchez Hinds  Date:2017  : 1955  [x]  Patient  Verified  Payor: [employer] / Plan: [address] [place] [address] / Product Type: PPO /    In time:230  Out time:315  Total Treatment Time (min): 45  Total Timed Codes (min): 45  1:1 Treatment Time (MC only):     Visit #: 4  16    Treatment Area: [x] Pelvic Floor     [] Other:    SUBJECTIVE  Pain Level (0-10 scale): 0/10  Any medication changes, allergies to medications, adverse drug reactions, diagnosis change, or new procedure performed?: [x] No    [] Yes (see summary sheet for update)  Subjective functional status/changes:   [] No changes reported  Pt reports that his bowels have been more frequent since starting prune juice and he thinks he has been emptying more however he woke up quite a bit last night    OBJECTIVE    Modality rationale: decrease pain, increase tissue extensibility and increase muscle contraction/control to improve the patients ability to colonic motility   Min Type Additional Details    [] Estim:  []Unatt       []IFC  []Premod                        []Other:  []w/ice   []w/heat  Position:  Location:    [] Estim: []Att    []TENS instruct  []NMES                    []Other:  []w/US   []w/ice   []w/heat  Position:  Location:    []  Ultrasound: []Continuous   [] Pulsed                           []1MHz   []3MHz Position:  Location:   With treatment []  Ice     [x]  heat  []  Ice massage  []  Laser   []  Anodyne Position:abdomen  Location:supine   [x] Skin assessment post-treatment:  [x]intact [x]redness- no adverse reaction    []redness  adverse reaction:            15 min Therapeutic Activity:  []  See flow sheet :    [x]  Increase Tissue extensibility        [x]  Assess fiber intake    [x]  Assess voiding habits  [x]  Assess bowel habits  [x]  Other:nutrition, constipation management, defecation dynamics   Rationale: increase ROM, improve coordination and increase proprioception to improve the patients ability to colonic motility        30 min Manual Therapy:  MFR to abdomen, diaphragm and colon massage   Rationale: increase ROM, increase tissue extensibility, decrease edema  and decrease trigger points to promote colonic motility      With   [] TE   [x] TA   [] neuro  [x] manual   [] other: Patient Education: [x] Review HEP    [] Progressed/Changed HEP based on:   [] positioning   [] body mechanics   [] transfers   [x] heat/ice application    [] other: colon massage     Other Objective/Functional Measures:     Pain Level (0-10 scale) post treatment: 0/10    ASSESSMENT/Changes in Function: Pt demonstrates moderate fascial restrictions throughout abdomen likely contributing to difficulty emptying and slow motility. Pt returned demonstration of colon massage and provided with handout to perform at home. Reviewed defecation dynamics    []  Decrease # of leaks   [] No change []  Improving [] Resolved     []  Decrease hypertonus [] No change []  Improving [] Resolved     []  Increase void interval [] No change []  Improving [] Resolved     []  Increase PF strength [] No change []  Improving [] Resolved     []  Increase PF endurance [] No change []  Improving [] Resolved     []  Increase endurance [] No change []  Improving [] Resolved     []  Decrease # of pads [] No change []  Improving [] Resolved     []  Decrease pain [] No change []  Improving [] Resolved     [x]  Increased coordination [] No change [x]  Improving [] Resolved     [x]  Increased Bowel Frequency [] No change [x]  Improving [] Resolved       Patient will continue to benefit from skilled PT services to address functional mobility deficits, address ROM deficits, address strength deficits and analyze and address soft tissue restrictions to attain remaining goals.      []  See Plan of Care  []  See progress note/recertification  []  See Discharge Summary         Progress towards goals / Updated goals:  Short Term Goals: To be accomplished in 5  weeks:  1. Patient will perform Home Exercise Program accurately as adjunct to PT clinic visits to promote healthy lifestyle and improve quality of life. Status @ Eval: to be initiated 2nd visit  Current: PROGRESSING and compliant  2. Patient will demonstrate accurate simulation of proper defecation dynamics with min cues for increased ease of defecation and more normal function.    Status @ eval: discussed benefits of squatty potty  Current: Progressing: Practiced relaxed voiding      Long Term Goals: To be accomplished in 8 weeks:  1. Patient demonstrate independence in HEP for maintenance of Pelvic Floor program and improved quality of life. Status @ Eval: to be initiated 2nd visit  Current: Modified nutrition  2. Patient will report bowel regularity no more than 3X/day with minimal to no straining for absence of constipation/decreased abdominal pain and ability to return to pain free ADL activity. Status @ Eval: 3-4x/day with inconsistent straining  Current: Progressing, less straining and improved emptying  3. Patient will demonstrate correct coordination of pelvic floor and abdominal musculature for proper defecation dynamics independently to facilitate more normal defecation/evacuation. Status @ Eval: to be reviewed  CUrrent: Progressing, discussed defecation dynamics and utilized biofeedback  4. Patient will have FOTO score Bowel Constipation change of 12 points indicating improvement in function.   Status @ Eval: 52    PLAN  []  Upgrade activities as tolerated     []  Continue plan of care  []  Update interventions per flow sheet       []  Discharge due to:_  []  Other:_      Howard Lilly PTA 11/9/2017  2:36 PM    Future Appointments  Date Time Provider Rosalie Espinal   11/15/2017 3:00 PM Griffin MICHEL [facility]   11/17/2017 9:15 AM ESTELLA Lees [facility]   11/20/2017 9:15 AM Nerissa [facility]   11/22/2017 3:00 PM ESTELLA Lees [facility] 11/27/2017 10:00 AM ESTELLA Ricardo THE [facility]   11/29/2017 3:15 PM ESTELLA Ricardo [facility]

## 2017-11-10 ENCOUNTER — APPOINTMENT (OUTPATIENT)
Dept: PHYSICAL THERAPY | Age: 62
End: 2017-11-10
Payer: COMMERCIAL

## 2017-11-15 ENCOUNTER — HOSPITAL ENCOUNTER (OUTPATIENT)
Dept: PHYSICAL THERAPY | Age: 62
Discharge: HOME | End: 2017-11-15
Payer: COMMERCIAL

## 2017-11-15 PROCEDURE — 97530 THERAPEUTIC ACTIVITIES: CPT

## 2017-11-15 PROCEDURE — 97112 NEUROMUSCULAR REEDUCATION: CPT

## 2017-11-15 NOTE — PROGRESS NOTES
PF DAILY TREATMENT NOTE 3    Patient Name: Osiel Loving  Date:11/15/2017  : 1955  [x]  Patient  Verified  Payor: [employer] / Plan: [address] [place] [address] / Product Type: PPO /    In time:300  Out time:345  Total Treatment Time (min): 45  Total Timed Codes (min): 45  1:1 Treatment Time (MC only):     Visit #: 5  16    Treatment Area: [] Pelvic Floor     [] Other:    SUBJECTIVE2  Pain Level (0-10 scale): 310  Any medication changes, allergies to medications, adverse drug reactions, diagnosis change, or new procedure performed?: [x] No    [] Yes (see summary sheet for update)  Subjective functional status/changes:   [] No changes reported  Pt reports that he is still having pain and some straining with bowel movements.  Pt reports that he is running and lifting weights again but is modifying tasks with weightlifting    OBJECTIVE       20 min Therapeutic Activity:  []  See flow sheet :    [x]  Increase Tissue extensibility        [x]  Assess fiber intake    [x]  Assess voiding habits  [x]  Assess bowel habits  [x]  Other:magnesium supplement, reviewed constipation strategies   Rationale: increase ROM, improve coordination and increase proprioception  to improve the patients ability to relax pelvic floor to empty bowels      25 min Neuromuscular Re-education:  []  See flow sheet :   []  Pelvic floor strengthening                 [x]  Pelvic floor downtraining  []  Quality pelvic floor contractions       [x]  Relaxation techniques  []  Urge suppression exercises  [x]  Other:groin stretching  Rationale: increase ROM, improve coordination and increase proprioception  to improve the patients ability to move pelvic floor muscles          With   [] TE   [x] TA   [x] neuro  [] manual   [] other: Patient Education: [x] Review HEP    [] Progressed/Changed HEP based on:   [] positioning   [x] body mechanics   [] transfers   [x] heat/ice application    [] other:      Other Objective/Functional Measures: Pain Level (0-10 scale) post treatment: 01/0    ASSESSMENT/Changes in Function: Pt demonstrates continued straining and difficulty voiding contributing to ongoing pain. Reviewed defecation dynamics and constipation strategies as well as nutrition to improve ease of movement. Added pf stretches as well as reviewed others. []  Decrease # of leaks   [] No change []  Improving [] Resolved     []  Decrease hypertonus [] No change []  Improving [] Resolved     []  Increase void interval [] No change []  Improving [] Resolved     []  Increase PF strength [] No change []  Improving [] Resolved     []  Increase PF endurance [] No change []  Improving [] Resolved     []  Increase endurance [] No change []  Improving [] Resolved     []  Decrease # of pads [] No change []  Improving [] Resolved     []  Decrease pain [] No change []  Improving [] Resolved     []  Increased coordination [] No change []  Improving [] Resolved     []  Increased Bowel Frequency [] No change []  Improving [] Resolved       Patient will continue to benefit from skilled PT services to address functional mobility deficits, address ROM deficits, address strength deficits and analyze and address soft tissue restrictions to attain remaining goals. []  See Plan of Care  []  See progress note/recertification  []  See Discharge Summary         Progress towards goals / Updated goals:  Short Term Goals: To be accomplished in 5  weeks:  1. Patient will perform Home Exercise Program accurately as adjunct to PT clinic visits to promote healthy lifestyle and improve quality of life. Status @ Eval: to be initiated 2nd visit  Current: PROGRESSING and compliant  2.  Patient will demonstrate accurate simulation of proper defecation dynamics with min cues for increased ease of defecation and more normal function.    Status @ eval: discussed benefits of squatty potty  Current: Progressing: Practiced relaxed voiding      Long Term Goals: To be accomplished in 8 weeks:  1. Patient demonstrate independence in HEP for maintenance of Pelvic Floor program and improved quality of life. Status @ Eval: to be initiated 2nd visit  Current: Modified nutrition  2. Patient will report bowel regularity no more than 3X/day with minimal to no straining for absence of constipation/decreased abdominal pain and ability to return to pain free ADL activity. Status @ Eval: 3-4x/day with inconsistent straining  Current: Progressing, less straining and improved emptying  3. Patient will demonstrate correct coordination of pelvic floor and abdominal musculature for proper defecation dynamics independently to facilitate more normal defecation/evacuation. Status @ Eval: to be reviewed  CUrrent: Progressing, discussed defecation dynamics adding stretches  4. Patient will have FOTO score Bowel Constipation change of 12 points indicating improvement in function.   Status @ Eval: 52    PLAN  []  Upgrade activities as tolerated     []  Continue plan of care  []  Update interventions per flow sheet       []  Discharge due to:_  []  Other:_      Janel Akhtar PTA 11/15/2017  3:00 PM    Future Appointments  Date Time Provider Rosalie Espinal   11/17/2017 9:15 AM ESTELLA Hanley [facility]   11/20/2017 9:15 AM Nerissa [facility]   11/22/2017 3:00 PM ESTELLA Hanley [facility]   11/27/2017 10:00 AM ESTELLA Hanley [facility]   11/29/2017 3:15 PM ESTELLA Hanley [facility]

## 2017-11-17 ENCOUNTER — HOSPITAL ENCOUNTER (OUTPATIENT)
Dept: PHYSICAL THERAPY | Age: 62
Discharge: HOME | End: 2017-11-17
Payer: COMMERCIAL

## 2017-11-17 PROCEDURE — 97530 THERAPEUTIC ACTIVITIES: CPT

## 2017-11-17 PROCEDURE — 97140 MANUAL THERAPY 1/> REGIONS: CPT

## 2017-11-17 NOTE — PROGRESS NOTES
PF DAILY TREATMENT NOTE 3-    Patient Name: Osman Quinn  Date:2017  : 1955  [x]  Patient  Verified  Payor: [employer] / Plan: [address] [place] [address] / Product Type: PPO /    In time: 03:20 Out time:04:00  Total Treatment Time (min): 40  Visit #: 5 of 16    Treatment Area: [x] Pelvic Floor     [] Other:    SUBJECTIVE  Pain Level (0-10 scale): 0/10  Any medication changes, allergies to medications, adverse drug reactions, diagnosis change, or new procedure performed?: [x] No    [] Yes (see summary sheet for update)  Subjective functional status/changes:   [] No changes reported  Patient reports he has been taking magnesium and has stopped metamucil the past few days with not difference noted.      OBJECTIVE    32 min Therapeutic Activity:  [x]  See flow sheet :    [x]  Increase Tissue extensibility        [x]  Assess fiber intake    []  Assess voiding habits  [x]  Assess bowel habits  []  Other: Review of HEP with emphasis on exercises to relax PFM and defecation dynamics with toileting position demonstrated using stool to increase anorectal angle   Rationale: increase ROM, increase strength and improve coordination  to improve the patients ability to have easier bowel movement    8 min Manual Therapy: rectal examination of EAS only secondary to patient tolerance and tension limiting testing    Rationale: decrease pain, increase ROM and decrease trigger points to have increased motility in PFM and relaxation with bowel movements           With   [] TE   [] TA   [] neuro  [] manual   [] other: Patient Education: [x] Review HEP    [] Progressed/Changed HEP based on:   [] positioning   [] body mechanics   [] transfers   [] heat/ice application    [] other:      Other Objective/Functional Measures:   Hypertonicity noted in EAS  Hemorrhoid (active) with noted errythema    Pain Level (0-10 scale) post treatment: 0/10    ASSESSMENT/Changes in Function:     PT attempted to perform rectal examination of PFM to assess PFM however hypertonicity and patient anxiety limited access to only EAS in which noted dyssenergia continues. Poor eccentric lengthening of PFM with \"bearing down\" in which patient would instead contract PFM. Patient will continue to benefit from skilled PT services to modify and progress therapeutic interventions, address functional mobility deficits, address ROM deficits, address strength deficits and analyze and address soft tissue restrictions to attain remaining goals. []  Decrease # of leaks   [] No change []  Improving [] Resolved     [x]  Decrease hypertonus [] No change [x]  Improving [] Resolved     []  Increase void interval [] No change []  Improving [] Resolved     []  Increase PF strength [] No change []  Improving [] Resolved     []  Increase PF endurance [] No change []  Improving [] Resolved     []  Increase endurance [] No change []  Improving [] Resolved     []  Decrease # of pads [] No change []  Improving [] Resolved     [x]  Decrease pain [] No change [x]  Improving [] Resolved     [x]  Increased coordination [] No change [x]  Improving [] Resolved     [x]  Decreased Bowel Frequency [] No change [x]  Improving [] Resolved        []  See Plan of Care  [x]  See progress note/recertification  []  See Discharge Summary         Progress towards goals / Updated goals:  Short Term Goals: To be accomplished in 4  weeks:  1. Patient will perform Home Exercise Program accurately as adjunct to PT clinic visits to promote healthy lifestyle and improve quality of life. Status @ Eval: to be initiated 2nd visit  Current: PROGRESSING and compliant  2.  Patient will demonstrate accurate simulation of proper defecation dynamics with min cues for increased ease of defecation and more normal function.    Status @ eval: discussed benefits of squatty potty  Current: Progressing: Practiced relaxed voiding and toileting position to improve anorectal angle       Long Term Goals: To be accomplished in 8 weeks:  1. Patient demonstrate independence in HEP for maintenance of Pelvic Floor program and improved quality of life. Status @ Eval: to be initiated 2nd visit  Current: Modified nutrition  2. Patient will report bowel regularity no more than 3X/day with minimal to no straining for absence of constipation/decreased abdominal pain and ability to return to pain free ADL activity. Status @ Eval: 3-4x/day with inconsistent straining  Current: 2-3X/day with improved straining however still occurs inconsistently-GOAL MET for bowel frequency  3. Patient will demonstrate correct coordination of pelvic floor and abdominal musculature for proper defecation dynamics independently to facilitate more normal defecation/evacuation. Status @ Eval: to be reviewed  CUrrent: Progressing, discussed defecation dynamics adding stretches  4. Patient will have FOTO score Bowel Constipation change of 12 points indicating improvement in function.   Status @ Eval: 47  Current: 52, change of 5 points       PLAN  []  Upgrade activities as tolerated     [x]  Continue plan of care  []  Update interventions per flow sheet       []  Discharge due to:_  []  Other:_      Jhony Nath 11/17/2017  4:07 PM    Future Appointments  Date Time Provider Rosalie Espinal   11/20/2017 9:15 AM Nerissa [facility]   11/21/2017 4:00 PM ESTELLA Rangel [facility]   11/27/2017 10:00 AM ESTELLA Rangel [facility]   11/29/2017 3:15 PM ESTELLA Rangel [facility]

## 2017-11-17 NOTE — PROGRESS NOTES
[facility] at [address] [place] [address]  Phone: [phone]   Fax: [phone]      Pelvic Floor Progress Note  Patient name: Torsten Muniz Start of Care: 2017   Referral source: Aman Yin DO : 1955   Medical/Treatment Diagnosis: Constipation [K59.00] Onset Date:2017 (date of surgery)      Prior Hospitalization: see medical history Provider#: [id number]   Medications: Verified on Patient Summary List    Comorbidities: hemorrhoids   Prior Level of Function: patient was independent with all ADLs and activities however now has decreased quality of life secondary to bowel function/pain        Visits from Start of Care: 5    Missed Visits: 0    Established Goals:           Excellent Good         Limited           None  [] Increase Pelvic MM strength       []  []  []  []  [x] Decrease Pelvic MM hypertonus     []  []  [x]  []  [] Decrease Incontinence Episodes    []  []  []  []   [x] Improve Voiding Habits        []  [x]  []  []   [] Decreased Urgency        []  []  []  []  [x] Decrease Pelvic Pain        []  [x]  []  []    Short Term Goals: To be accomplished in 4  weeks:  1. Patient will perform Home Exercise Program accurately as adjunct to PT clinic visits to promote healthy lifestyle and improve quality of life. Status @ Eval: to be initiated 2nd visit  Current: PROGRESSING and compliant  2. Patient will demonstrate accurate simulation of proper defecation dynamics with min cues for increased ease of defecation and more normal function.    Status @ eval: discussed benefits of squatty potty  Current: Progressing: Practiced relaxed voiding and toileting position to improve anorectal angle       Long Term Goals: To be accomplished in 8 weeks:  1. Patient demonstrate independence in HEP for maintenance of Pelvic Floor program and improved quality of life. Status @ Eval: to be initiated 2nd visit  Current: Modified nutrition  2.  Patient will report bowel regularity no more than 3X/day with minimal to no straining for absence of constipation/decreased abdominal pain and ability to return to pain free ADL activity. Status @ Eval: 3-4x/day with inconsistent straining  Current: 2-3X/day with improved straining however still occurs inconsistently-GOAL MET for bowel frequency  3. Patient will demonstrate correct coordination of pelvic floor and abdominal musculature for proper defecation dynamics independently to facilitate more normal defecation/evacuation. Status @ Eval: to be reviewed  CUrrent: Progressing, discussed defecation dynamics adding stretches  4. Patient will have FOTO score Bowel Constipation change of 12 points indicating improvement in function. Status @ Eval: 47  Current: 52, change of 5 points    Key Functional Changes: decrease bowel frequency from more than 3x/day to 2-3X/day within normal range    Updated Goals: to be achieved in 4 weeks:   Continue with unmet goals     ASSESSMENT/RECOMMENDATIONS:  PT attempted to perform rectal examination of PFM to assess PFM however hypertonicity and patient anxiety limited access to only EAS in which noted dyssenergia continues. Poor eccentric lengthening of PFM with \"bearing down\" in which patient would instead contract PFM. Patient will continue to benefit from skilled PT services to modify and progress therapeutic interventions, address functional mobility deficits, address ROM deficits, address strength deficits and analyze and address soft tissue restrictions to attain remaining goals.     [x]Continue therapy per initial plan/protocol at a frequency of  2 x per week for 4 weeks  []Continue therapy with the following recommended changes:_____________________      _____________________________________________________________________  []Discontinue therapy progressing towards or have reached established goals  []Discontinue therapy due to lack of appreciable progress towards goals  []Discontinue therapy due to lack of attendance or compliance  []Await Physician's recommendations/decisions regarding therapy  []Other:________________________________________________________________    Thank you for this referral.   Jhony Nath 11/17/2017 4:18 PM  NOTE TO PHYSICIAN:  PLEASE COMPLETE THE ORDERS BELOW AND   FAX TO [facility] at [facility]    Fax: [phone]  If you are unable to process this request in 24 hours please contact our office:    [phone]      ? I have read the above report and request that my patient continue as recommended. ? I have read the above report and request that my patient continue therapy with the following changes/special instructions:___________________________________________________________  ? I have read the above report and request that my patient be discharged from therapy.     Physicians signature: _______________________________Date: _____Time:_____

## 2017-11-20 ENCOUNTER — HOSPITAL ENCOUNTER (OUTPATIENT)
Dept: PHYSICAL THERAPY | Age: 62
Discharge: HOME | End: 2017-11-20
Payer: COMMERCIAL

## 2017-11-20 PROCEDURE — 97110 THERAPEUTIC EXERCISES: CPT

## 2017-11-20 NOTE — PROGRESS NOTES
PF DAILY TREATMENT NOTE 3-16    Patient Name: Amira Johnson  Date:2017  : 1955  [x]  Patient  Verified  Payor: [employer] [place] / Plan: [address] [place] [address] / Product Type: PPO /    In time:09:15  Out time:09:55  Total Treatment Time (min): 40  Visit #: 7 of 14    Treatment Area: [x] Pelvic Floor     [] Other:    SUBJECTIVE  Pain Level (0-10 scale): 2-3/10  Any medication changes, allergies to medications, adverse drug reactions, diagnosis change, or new procedure performed?: [x] No    [] Yes (see summary sheet for update)  Subjective functional status/changes:   [] No changes reported  Patient reports he feels like PFM are more relaxed. Still sore with bowel movements.      OBJECTIVE    Modality rationale: decrease pain and increase tissue extensibility to improve the patients ability to relax PFM   Min Type Additional Details    [] Estim:  []Unatt       []IFC  []Premod                        []Other:  []w/ice   []w/heat  Position:  Location:    [] Estim: []Att    []TENS instruct  []NMES                    []Other:  []w/US   []w/ice   []w/heat  Position:  Location:    []  Ultrasound: []Continuous   [] Pulsed                           []1MHz   []3MHz Position:  Location:   15 []  Ice     [x]  heat  []  Ice massage  []  Laser   []  Anodyne Position: prone  Location: gluteal/SI region   [] Skin assessment post-treatment:  []intact []redness- no adverse reaction    []redness  adverse reaction:         25 min Therapeutic Exercise:  [] See flow sheet :   []  Pelvic floor strengthening                 [x]  Pelvic floor downtraining  []  Quality pelvic floor contractions       [x]  Relaxation techniques  []  Urge suppression exercises  []  Other: diaphragmatic breathing with extended exhalation with restorative poses  Rationale: increase ROM, increase strength and improve coordination  to improve the patients ability to relax PFM       With   [] TE   [] TA   [] neuro  [] manual   [] other: Patient Education: [x] Review HEP    [] Progressed/Changed HEP based on:   [] positioning   [] body mechanics   [] transfers   [] heat/ice application    [] other:      Other Objective/Functional Measures:   Refer to goals     Pain Level (0-10 scale) post treatment:  1/10    ASSESSMENT/Changes in Function:   Patient demonstrating improved diaphragmatic breathing technique with restorative poses with minimal vc's from PT. PT progressed HEP to include restorative poses. []  Decrease # of leaks   [] No change []  Improving [] Resolved     []  Decrease hypertonus [] No change []  Improving [] Resolved     []  Increase void interval [] No change []  Improving [] Resolved     []  Increase PF strength [] No change []  Improving [] Resolved     []  Increase PF endurance [] No change []  Improving [] Resolved     []  Increase endurance [] No change []  Improving [] Resolved     []  Decrease # of pads [] No change []  Improving [] Resolved     []  Decrease pain [] No change []  Improving [] Resolved     []  Increased coordination [] No change []  Improving [] Resolved     []  Increased Bowel Frequency [] No change []  Improving [] Resolved       Patient will continue to benefit from skilled PT services to modify and progress therapeutic interventions, address functional mobility deficits, address ROM deficits, address strength deficits and analyze and address soft tissue restrictions to attain remaining goals. []  See Plan of Care  []  See progress note/recertification  []  See Discharge Summary         Progress towards goals / Updated goals:  Short Term Goals: To be accomplished in 4  weeks:  1. Patient will perform Home Exercise Program accurately as adjunct to PT clinic visits to promote healthy lifestyle and improve quality of life. Status @ Eval: to be initiated 2nd visit  Current: PROGRESSING and compliant  2.  Patient will demonstrate accurate simulation of proper defecation dynamics with min cues for increased ease of defecation and more normal function.    Status @ eval: discussed benefits of squatty potty  Current: Progressing: Practiced relaxed voiding and toileting position to improve anorectal angle       Long Term Goals: To be accomplished in 8 weeks:  1. Patient demonstrate independence in HEP for maintenance of Pelvic Floor program and improved quality of life. Status @ Eval: to be initiated 2nd visit  Current: Progressed HEP with restorative poses  2. Patient will report bowel regularity no more than 3X/day with minimal to no straining for absence of constipation/decreased abdominal pain and ability to return to pain free ADL activity. Status @ Eval: 3-4x/day with inconsistent straining  Current: 2-3X/day with improved straining however still occurs inconsistently-GOAL MET for bowel frequency  3. Patient will demonstrate correct coordination of pelvic floor and abdominal musculature for proper defecation dynamics independently to facilitate more normal defecation/evacuation. Status @ Eval: to be reviewed  CUrrent: Progressing, discussed defecation dynamics adding stretches  4. Patient will have FOTO score Bowel Constipation change of 12 points indicating improvement in function.   Status @ Eval: 47  Current: 52, change of 5 points       PLAN  []  Upgrade activities as tolerated     [x]  Continue plan of care  []  Update interventions per flow sheet       []  Discharge due to:_  []  Other:_      Majo Braswell 11/20/2017  9:20 AM    Future Appointments  Date Time Provider Rosalie Espinal   11/21/2017 4:00 PM Shellie Love [facility]   11/27/2017 10:00 AM ESTELLA Love [facility]   11/29/2017 3:15 PM ESTELLA Love [facility]

## 2017-11-21 ENCOUNTER — HOSPITAL ENCOUNTER (OUTPATIENT)
Dept: PHYSICAL THERAPY | Age: 62
Discharge: HOME | End: 2017-11-21
Payer: COMMERCIAL

## 2017-11-21 PROCEDURE — 97112 NEUROMUSCULAR REEDUCATION: CPT

## 2017-11-21 PROCEDURE — 97140 MANUAL THERAPY 1/> REGIONS: CPT

## 2017-11-21 PROCEDURE — 97530 THERAPEUTIC ACTIVITIES: CPT

## 2017-11-21 NOTE — PROGRESS NOTES
PF DAILY TREATMENT NOTE MCR 3-16    Patient Name: Gabi Hayes  Date:2017  : 1955  [x]  Patient  Verified  Payor: [employer] / Plan: [address] [place] [address] / Product Type: PPO /    In time:345  Out time:440  Total Treatment Time (min): 55  Total Timed Codes (min): 55  1:1 Treatment Time ( only):     Visit #: 8  14    Treatment Area: [x] Pelvic Floor     [] Other:    SUBJECTIVE  Pain Level (0-10 scale): 1/10  Any medication changes, allergies to medications, adverse drug reactions, diagnosis change, or new procedure performed?: [x] No    [] Yes (see summary sheet for update)  Subjective functional status/changes:   [] No changes reported  Pt reports that he is still sore near his rectum all the time but he is having less pain or no pain after bowel movements and thinks he is not straining    OBJECTIVE    Modality rationale: decrease inflammation, decrease pain and increase tissue extensibility to improve the patients ability to relax pelvic floor   Min Type Additional Details    [] Estim:  []Unatt       []IFC  []Premod                        []Other:  []w/ice   []w/heat  Position:  Location:    [] Estim: []Att    []TENS instruct  []NMES                    []Other:  []w/US   []w/ice   []w/heat  Position:  Location:    []  Ultrasound: []Continuous   [] Pulsed                           []1MHz   []3MHz Position:  Location:   5 min []  Ice     [x]  heat  []  Ice massage  []  Laser   []  Anodyne Position:proneLocation:groin   [x] Skin assessment post-treatment:  [x]intact [x]redness- no adverse reaction    []redness  adverse reaction:            15 min Therapeutic Activity:  [x]  See flow sheet :    [x]  Increase Tissue extensibility        [x]  Assess fiber intake    [x]  Assess voiding habits  [x]  Assess bowel habits  [x]  Other:relaxed voiding, defecation dynamics, diaphragmatic breathing   Rationale: increase ROM, improve coordination and increase proprioception  to improve the patients ability to empty bowels more completely      20 min Neuromuscular Re-education:  []  See flow sheet :   []  Pelvic floor strengthening                 [x]  Pelvic floor downtraining  []  Quality pelvic floor contractions       [x]  Relaxation techniques  []  Urge suppression exercises  [x]  Other:PF stretches  Rationale: increase ROM, improve coordination and increase proprioception  to improve the patients ability to relax pelvic floor to improve proprioception    15 min Manual Therapy: Intrarectal MFR to pubococcygeus and levators bilaterally, manual stretching   Rationale: decrease pain, increase ROM, increase tissue extensibility, decrease edema  and decrease trigger points to empty bowels more completely    With   [] TE   [x] TA   [x] neuro  [x] manual   [] other: Patient Education: [x] Review HEP    [] Progressed/Changed HEP based on:   [] positioning   [] body mechanics   [] transfers   [x] heat/ice application    [] other:      Other Objective/Functional Measures:       Pain Level (0-10 scale) post treatment: 0/10    ASSESSMENT/Changes in Function: Pt demonstrates very limited ms mobility in anal sphincter. Therapist noted pulsing of ms during treatment that reduced to some small degree. Additionally softening and relaxation was noted with contined myofascial restrictions in levators and throughout on the right side contributing to ongoing soreness. Pt reports resolved pain at end of treatment.     []  Decrease # of leaks   [] No change []  Improving [] Resolved     []  Decrease hypertonus [] No change []  Improving [] Resolved     []  Increase void interval [] No change []  Improving [] Resolved     []  Increase PF strength [] No change []  Improving [] Resolved     []  Increase PF endurance [] No change []  Improving [] Resolved     []  Increase endurance [] No change []  Improving [] Resolved     []  Decrease # of pads [] No change []  Improving [] Resolved     []  Decrease pain [] No change []  Improving [] Resolved     []  Increased coordination [] No change []  Improving [] Resolved     []  Increased Bowel Frequency [] No change []  Improving [] Resolved       Patient will continue to benefit from skilled PT services to address functional mobility deficits, address ROM deficits, address strength deficits and analyze and address soft tissue restrictions to attain remaining goals. []  See Plan of Care  []  See progress note/recertification  []  See Discharge Summary         Progress towards goals / Updated goals:  Short Term Goals: To be accomplished in 4  weeks:  1. Patient will perform Home Exercise Program accurately as adjunct to PT clinic visits to promote healthy lifestyle and improve quality of life. Status @ Eval: to be initiated 2nd visit  Current: PROGRESSING and compliant  2. Patient will demonstrate accurate simulation of proper defecation dynamics with min cues for increased ease of defecation and more normal function.    Status @ eval: discussed benefits of squatty potty  Current: Progressing: Discussed following manual work      Long Term Goals: To be accomplished in 8 weeks:  1. Patient demonstrate independence in HEP for maintenance of Pelvic Floor program and improved quality of life. Status @ Eval: to be initiated 2nd visit  Current: Progressed HEP with restorative poses  2. Patient will report bowel regularity no more than 3X/day with minimal to no straining for absence of constipation/decreased abdominal pain and ability to return to pain free ADL activity. Status @ Eval: 3-4x/day with inconsistent straining  Current: 2-3X/day with improved straining however still occurs inconsistently-GOAL MET for bowel frequency  3. Patient will demonstrate correct coordination of pelvic floor and abdominal musculature for proper defecation dynamics independently to facilitate more normal defecation/evacuation.   Status @ Eval: to be reviewed  CUrrent: Progressing, discussed defecation dynamics adding stretches  4. Patient will have FOTO score Bowel Constipation change of 12 points indicating improvement in function.   Status @ Eval: 47  Current: 52, change of 5 points          PLAN  []  Upgrade activities as tolerated     [x]  Continue plan of care  []  Update interventions per flow sheet       []  Discharge due to:_  []  Other:_      Renato Rosa PTA 11/21/2017  3:54 PM    Future Appointments  Date Time Provider Rosalie Espinal   11/21/2017 4:00 PM Shellie Camp [facility]   11/27/2017 10:00 AM ESTELLA Camp [facility]   11/29/2017 3:15 PM ESTELLA Camp [facility]

## 2017-11-22 ENCOUNTER — APPOINTMENT (OUTPATIENT)
Dept: PHYSICAL THERAPY | Age: 62
End: 2017-11-22
Payer: COMMERCIAL

## 2017-11-27 ENCOUNTER — HOSPITAL ENCOUNTER (OUTPATIENT)
Dept: PHYSICAL THERAPY | Age: 62
Discharge: HOME | End: 2017-11-27
Payer: COMMERCIAL

## 2017-11-27 PROCEDURE — 97112 NEUROMUSCULAR REEDUCATION: CPT

## 2017-11-27 PROCEDURE — 97530 THERAPEUTIC ACTIVITIES: CPT

## 2017-11-27 NOTE — PROGRESS NOTES
PF DAILY TREATMENT NOTE 3-16    Patient Name: Ada Pina  Date:2017  : 1955  [x]  Patient  Verified  Payor: [employer] / Plan: [address] [place] [address] / Product Type: PPO /    In time:955  Out time:1030  Total Treatment Time (min): 35  Total Timed Codes (min): 35  1:1 Treatment Time ( only):     Visit #:  14    Treatment Area: [x] Pelvic Floor     [] Other:    SUBJECTIVE  Pain Level (0-10 scale): 1-2/10  Any medication changes, allergies to medications, adverse drug reactions, diagnosis change, or new procedure performed?: [x] No    [] Yes (see summary sheet for update)  Subjective functional status/changes:   [] No changes reported  Pt felt that he was completely relieved of pain after the first manual treatment but had a lot of tenderness for days  But feels that he is not ready to do it again. He states that he did attempt to manage the pain but found it took awhile. Pt states that he does not strain as much over the last week with stool softeners. Pt states that he is having bowel movement 3-4 a day and mostly in the morning.   OBJECTIVE               10 min Therapeutic Activity:  []  See flow sheet :    [x]  Increase Tissue extensibility        [x]  Assess fiber intake    [x]  Assess voiding habits  [x]  Assess bowel habits  [x]  Other:pain management, defecation dynamics, diaphragmatic breathing   Rationale: increase ROM, improve coordination and increase proprioception  to improve the patients ability to relax pelvic floor to empty bowels with ease      25 min Neuromuscular Re-education:  []  See flow sheet :   [x]  Pelvic floor strengthening                 [x]  Pelvic floor downtraining  [x]  Quality pelvic floor contractions       [x]  Relaxation techniques  []  Urge suppression exercises  [x]  Other:biofeedback, restorative yoga, happy baby and hamstring stretch  Rationale: increase ROM, increase strength, improve coordination and increase proprioception  to improve the patients ability to empty bowels with ease          With   [] TE   [x] TA   [x] neuro  [] manual   [] other: Patient Education: [x] Review HEP    [] Progressed/Changed HEP based on:   [] positioning   [] body mechanics   [] transfers   [x] heat/ice application    [x] other:pain management      Other Objective/Functional Measures:   []baseline resting tone: 3-5 mv in supine and sitting, however with breathing reduced to 1.8 mv  []slow twitch mms 7.3 mv  []fast twitch mms    Pain Level (0-10 scale) post treatment: 0/10    ASSESSMENT/Changes in Function: Pt demonstrates continued difficulty with relaxing during biofeedback requiring cueing for breathing and relaxing and initially pt tone was elevated 3-5 mv in sitting and supine. Reviewed stretches to help promote improved blood flow and mobility to pelvic floor    []  Decrease # of leaks   [] No change []  Improving [] Resolved     [x]  Decrease hypertonus [] No change [x]  Improving [] Resolved     []  Increase void interval [] No change []  Improving [] Resolved     [x]  Increase PF strength [] No change [x]  Improving [] Resolved     []  Increase PF endurance [] No change []  Improving [] Resolved     []  Increase endurance [] No change []  Improving [] Resolved     []  Decrease # of pads [] No change []  Improving [] Resolved     [x]  Decrease pain [] No change [x]  Improving [] Resolved     [x]  Increased coordination [] No change [x]  Improving [] Resolved     []  Increased Bowel Frequency [] No change []  Improving [] Resolved       Patient will continue to benefit from skilled PT services to address functional mobility deficits, address ROM deficits, address strength deficits and analyze and address soft tissue restrictions to attain remaining goals. []  See Plan of Care  []  See progress note/recertification  []  See Discharge Summary         Progress towards goals / Updated goals:  Short Term Goals: To be accomplished in 4  weeks:  1.  Patient will perform Home Exercise Program accurately as adjunct to PT clinic visits to promote healthy lifestyle and improve quality of life. Status @ Eval: to be initiated 2nd visit  Current: PROGRESSING and compliant  2. Patient will demonstrate accurate simulation of proper defecation dynamics with min cues for increased ease of defecation and more normal function.    Status @ eval: discussed benefits of squatty potty  Current: MET: Pt is familiar however continues to strain some  [id number] be accomplished in 8 weeks:  1. Patient demonstrate independence in HEP for maintenance of Pelvic Floor program and improved quality of life. Status @ Eval: to be initiated 2nd visit  Current: Unable to progress however reviewed HEP and extended restorative stretches  2. Patient will report bowel regularity no more than 3X/day with minimal to no straining for absence of constipation/decreased abdominal pain and ability to return to pain free ADL activity. Status @ Eval: 3-4x/day with inconsistent straining  Current: 2-3X/day with improved straining however still occurs inconsistently-GOAL MET for bowel frequency  3. Patient will demonstrate correct coordination of pelvic floor and abdominal musculature for proper defecation dynamics independently to facilitate more normal defecation/evacuation. Status @ Eval: to be reviewed  CUrrent: Progressing, reviewed with biofeedback  4. Patient will have FOTO score Bowel Constipation change of 12 points indicating improvement in function.   Status @ Eval: 47  Current: 52, change of 5 points        PLAN  []  Upgrade activities as tolerated     [x]  Continue plan of care  []  Update interventions per flow sheet       []  Discharge due to:_  []  Other:_      Scott Antunez PTA 11/27/2017  9:58 AM    Future Appointments  Date Time Provider Rosalie Espinal   11/27/2017 10:00 AM ESTELLA Rangel [facility]   11/29/2017 3:00 PM ESTELLA Rangel [facility]

## 2017-11-29 ENCOUNTER — HOSPITAL ENCOUNTER (OUTPATIENT)
Dept: PHYSICAL THERAPY | Age: 62
Discharge: HOME | End: 2017-11-29
Payer: COMMERCIAL

## 2017-11-29 PROCEDURE — 97530 THERAPEUTIC ACTIVITIES: CPT

## 2017-11-29 PROCEDURE — 97140 MANUAL THERAPY 1/> REGIONS: CPT

## 2017-11-29 PROCEDURE — 97112 NEUROMUSCULAR REEDUCATION: CPT

## 2017-11-29 NOTE — PROGRESS NOTES
PF DAILY TREATMENT NOTE 3-16    Patient Name: Jose Jasso  Date:2017  : 1955  [x]  Patient  Verified  Payor: RAJENDRA [employer] / Plan: [address] [place] [address] / Product Type: PPO /    In time:300  Out time:345  Total Treatment Time (min): 45  Total Timed Codes (min): 45  1:1 Treatment Time (MC only):     Visit #: 10 of 14    Treatment Area: [x] Pelvic Floor     [] Other:    SUBJECTIVE  Pain Level (0-10 scale):1/10  Any medication changes, allergies to medications, adverse drug reactions, diagnosis change, or new procedure performed?: [x] No    [] Yes (see summary sheet for update)  Subjective functional status/changes:   [] No changes reported  Pt reports that he is starting to feel better. Upon reflection he thinks that maybe his going for a run after his last manual treatment may have contributed to his lingering pain. He reports compliance daily 75% of the time with all of HEP.     OBJECTIVE    Modality rationale: decrease pain and increase tissue extensibility to improve the patients ability to relax pelvic floor   Min Type Additional Details    [] Estim:  []Unatt       []IFC  []Premod                        []Other:  []w/ice   []w/heat  Position:  Location:    [] Estim: []Att    []TENS instruct  []NMES                    []Other:  []w/US   []w/ice   []w/heat  Position:  Location:    []  Ultrasound: []Continuous   [] Pulsed                           []1MHz   []3MHz Position:  Location:   5 []  Ice     [x]  heat  []  Ice massage  []  Laser   []  Anodyne Position:proneLocation:low back/groin   [x] Skin assessment post-treatment:  [x]intact [x]redness- no adverse reaction    []redness  adverse reaction:              10 min Therapeutic Activity:  [x]  See flow sheet :    [x]  Increase Tissue extensibility        [x]  Assess fiber intake    [x]  Assess voiding habits  [x]  Assess bowel habits  [x]  Other:relaxed voiding, defecation dynamics   Rationale: increase ROM, improve coordination and increase proprioception  to improve the patients ability to relax pelvic floor      15 min Neuromuscular Re-education:  [x]  See flow sheet :   []  Pelvic floor strengthening                 [x]  Pelvic floor downtraining  []  Quality pelvic floor contractions       [x]  Relaxation techniques  []  Urge suppression exercises  [x]  Other:restorative nelly pose  Rationale: increase ROM, increase strength, improve coordination and increase proprioception  to improve the patients ability to relax pelvic floor to empty bowels better    15 min Manual Therapy:  Intrarectal MFR to left levators, pubococcygeus and coccygeus and right obterator internus   Rationale: decrease pain, increase ROM, increase tissue extensibility, decrease edema  and decrease trigger points to relax pelvic floor          With   [] TE   [x] TA   [x] neuro  [x] manual   [] other: Patient Education: [x] Review HEP    [] Progressed/Changed HEP based on:   [x] positioning   [] body mechanics   [] transfers   [x] heat/ice application    [x] other: stretch twice daily     Other Objective/Functional Measures:   2/5 MMT     Pain Level (0-10 scale) post treatment: 0/10    ASSESSMENT/Changes in Function: Pt continues to have some spasming however reduced since last manual treatment. He also had difficulty relaxing left side following cue to contract. Pt encouraged to stretch twice daily as well as to not exercise for a day or two after treatment to keep inflammation levels low.  Reviewed pain management and inflammation management    []  Decrease # of leaks   [] No change []  Improving [] Resolved     [x]  Decrease hypertonus [] No change [x]  Improving [] Resolved     []  Increase void interval [] No change []  Improving [] Resolved     []  Increase PF strength [] No change []  Improving [] Resolved     []  Increase PF endurance [] No change []  Improving [] Resolved     []  Increase endurance [] No change []  Improving [] Resolved     []  Decrease # of pads [] No change []  Improving [] Resolved     [x]  Decrease pain [] No change [x]  Improving [] Resolved     [x]  Increased coordination [] No change [x]  Improving [] Resolved     []  Increased Bowel Frequency [] No change []  Improving [] Resolved       Patient will continue to benefit from skilled PT services to address ROM deficits, address strength deficits, analyze and address soft tissue restrictions and analyze and cue movement patterns to attain remaining goals. []  See Plan of Care  []  See progress note/recertification  []  See Discharge Summary         Progress towards goals / Updated goals:  Short Term Goals: To be accomplished in 4  weeks:  1. Patient will perform Home Exercise Program accurately as adjunct to PT clinic visits to promote healthy lifestyle and improve quality of life. Status @ Eval: to be initiated 2nd visit  Current: PROGRESSING and compliant  2. Patient will demonstrate accurate simulation of proper defecation dynamics with min cues for increased ease of defecation and more normal function.    Status @ eval: discussed benefits of squatty potty  Current: MET: Pt is familiar however continues to strain some  [address] be accomplished in 8 weeks:  1. Patient demonstrate independence in HEP for maintenance of Pelvic Floor program and improved quality of life. Status @ Eval: to be initiated 2nd visit  Current: Unable to progress however reviewed HEP and extended restorative stretches  2. Patient will report bowel regularity no more than 3X/day with minimal to no straining for absence of constipation/decreased abdominal pain and ability to return to pain free ADL activity. Status @ Eval: 3-4x/day with inconsistent straining  Current: 2-3X/day with improved straining however still occurs inconsistently-GOAL MET for bowel frequency  3.  Patient will demonstrate correct coordination of pelvic floor and abdominal musculature for proper defecation dynamics independently to facilitate more normal defecation/evacuation. Status @ Eval: to be reviewed  CUrrent: Progressing, however pt demonstrates continued spasming, although less and has difficulty relaxing on left side contributing to ongoing bowel frequency  4. Patient will have FOTO score Bowel Constipation change of 12 points indicating improvement in function.   Status @ Eval: 47  Current: 52, change of 5 points        PLAN  []  Upgrade activities as tolerated     [x]  Continue plan of care  []  Update interventions per flow sheet       []  Discharge due to:_  []  Other:_      Mile Shipley PTA 11/29/2017  3:01 PM    Future Appointments  Date Time Provider Rosalie Espinal   12/4/2017 2:00 PM Shellie Jay [facility]   12/7/2017 3:15 PM Mile Shipley PTA [facility]   12/14/2017 2:00 PM Mile Shipley PTA [facility]   12/15/2017 2:15 PM Mile Shipley PTA [facility]   12/18/2017 1:30 PM Mile Shipley PTA [facility]   12/21/2017 1:15 PM Mile Shipley PTA [facility]   12/27/2017 11:15 AM Mile Shipley PTA [facility]   12/29/2017 12:30 PM Mile Shipley PTA [facility]

## 2017-12-04 ENCOUNTER — HOSPITAL ENCOUNTER (OUTPATIENT)
Dept: PHYSICAL THERAPY | Age: 62
Discharge: HOME | End: 2017-12-04
Payer: COMMERCIAL

## 2017-12-04 PROCEDURE — 97530 THERAPEUTIC ACTIVITIES: CPT

## 2017-12-04 PROCEDURE — 97112 NEUROMUSCULAR REEDUCATION: CPT

## 2017-12-04 NOTE — PROGRESS NOTES
PF DAILY TREATMENT NOTE 3-16    Patient Name: Sanchez Hinds  Date:2017  : 1955  [x]  Patient  Verified  Payor: RAJENDRA [place] / Plan: [address] [place] [address] / Product Type: PPO /    In time:205  Out time:240  Total Treatment Time (min): 35  Total Timed Codes (min): 35  1:1 Treatment Time ( only):     Visit #:  14    Treatment Area: [x] Pelvic Floor     [] Other:    SUBJECTIVE  Pain Level (0-10 scale): 0/10  Any medication changes, allergies to medications, adverse drug reactions, diagnosis change, or new procedure performed?: [x] No    [] Yes (see summary sheet for update)  Subjective functional status/changes:   [] No changes reported  Pt reports that he isn't straining most of the time having 3-4 bowel movements. He states that he isn't interested in reducing his stool softeners at this juncture. He states that his pain has been low 1-2/10, and he thinks that it comes following 2-3 bowel movements in a day. He adds that he is doing his stretches twice daily and that he does them to reduce pain.     OBJECTIVE          10 min Therapeutic Activity:  [x]  See flow sheet :    [x]  Increase Tissue extensibility        [x]  Assess fiber intake    [x]  Assess voiding habits  [x]  Assess bowel habits  [x]  Other: Diaphragmatic breathing, self management of pain, epsom salt baths  Rationale: increase ROM, improve coordination and increase proprioception  to improve the patients ability to relax pelvic floor to improve bowel emptying      25 min Neuromuscular Re-education:  []  See flow sheet :   [x]  Pelvic floor strengthening                 [x]  Pelvic floor downtraining  [x]  Quality pelvic floor contractions       [x]  Relaxation techniques  []  Urge suppression exercises  [x]  Other:biofeedback, diaphragmatic breathing, restorative yoga  Rationale: increase ROM, increase strength, improve coordination and increase proprioception  to improve the patients ability to relax pelvic floor to improve bowel emptying          With   [] TE   [x] TA   [x] neuro  [] manual   [] other: Patient Education: [x] Review HEP    [] Progressed/Changed HEP based on:   [] positioning   [] body mechanics   [] transfers   [x] heat/ice application    [x] other: reviewed stretches     Other Objective/Functional Measures:   []baseline resting tone: seated 2-3 mv/3.6 mv with kegels  []slow twitch mms 14.4 mv  []fast twitch mms    Pain Level (0-10 scale) post treatment:0/10    ASSESSMENT/Changes in Function: Pt demonstrates improved ability to reduce hypertonicity during biofeedback while in sitting with tone dropping from 3-2 mv and with reduced ease and coordination by end of 15 repetitions. Pt refused to complete manual work but agreed it is helpful and that we could resume at next meeting    []  Decrease # of leaks   [] No change []  Improving [] Resolved     [x]  Decrease hypertonus [] No change [x]  Improving [] Resolved     []  Increase void interval [] No change []  Improving [] Resolved     []  Increase PF strength [] No change []  Improving [] Resolved     []  Increase PF endurance [] No change []  Improving [] Resolved     []  Increase endurance [] No change []  Improving [] Resolved     []  Decrease # of pads [] No change []  Improving [] Resolved     []  Decrease pain [] No change []  Improving [] Resolved     [x]  Increased coordination [] No change [x]  Improving [] Resolved     [x]  Increased Bowel Frequency [] No change [x]  Improving [] Resolved       Patient will continue to benefit from skilled PT services to address functional mobility deficits, address ROM deficits, address strength deficits and analyze and address soft tissue restrictions to attain remaining goals. []  See Plan of Care  []  See progress note/recertification  []  See Discharge Summary         Progress towards goals / Updated goals:  Short Term Goals: To be accomplished in 4  weeks:  1.  Patient will perform Home Exercise Program accurately as adjunct to PT clinic visits to promote healthy lifestyle and improve quality of life. Status @ Eval: to be initiated 2nd visit  Current: PROGRESSING and compliant  2. Patient will demonstrate accurate simulation of proper defecation dynamics with min cues for increased ease of defecation and more normal function.    Status @ eval: discussed benefits of squatty potty  Current: MET: Pt is familiar however continues to strain some  [address] be accomplished in 8 weeks:  1. Patient demonstrate independence in [facility] for maintenance of Pelvic Floor program and improved quality of life. Status @ Eval: to be initiated 2nd visit  Current: continue with program and nearly independent  2. Patient will report bowel regularity no more than 3X/day with minimal to no straining for absence of constipation/decreased abdominal pain and ability to return to pain free ADL activity. Status @ Eval: 3-4x/day with inconsistent straining  Current: 3-x day with less straining-GOAL MET for bowel frequency  3. Patient will demonstrate correct coordination of pelvic floor and abdominal musculature for proper defecation dynamics independently to facilitate more normal defecation/evacuation. Status @ Eval: to be reviewed  CUrrent: Progressing, with improved emptying and reduced strain necessary  4. Patient will have FOTO score Bowel Constipation change of 12 points indicating improvement in function.   Status @ Eval: 47  Current: 52, change of 5 points    PLAN  []  Upgrade activities as tolerated     [x]  Continue plan of care  []  Update interventions per flow sheet       []  Discharge due to:_  []  Other:_      Sigifredo Suárez PTA 12/4/2017  1:19 PM    Future Appointments  Date Time Provider Rosalie Espinal   12/4/2017 2:00 PM Shellie Abad [facility]   12/7/2017 3:15 PM ESTELLA Abad [facility]   12/14/2017 2:00 PM ESTELLA Abad [facility]   12/15/2017 2:15 PM ESTELLA Abad [facility] 12/18/2017 1:30 PM ESTELLA Ramirez [facility]   12/21/2017 1:15 PM ESTELLA Ramirez [facility]   12/27/2017 11:15 AM ESTELLA Ramirez [facility]   12/29/2017 12:30 PM ESTELLA Ramirez [facility]

## 2017-12-07 ENCOUNTER — HOSPITAL ENCOUNTER (OUTPATIENT)
Dept: PHYSICAL THERAPY | Age: 62
Discharge: HOME | End: 2017-12-07
Payer: COMMERCIAL

## 2017-12-07 PROCEDURE — 97530 THERAPEUTIC ACTIVITIES: CPT

## 2017-12-07 PROCEDURE — 97140 MANUAL THERAPY 1/> REGIONS: CPT

## 2017-12-07 NOTE — PROGRESS NOTES
PF DAILY TREATMENT NOTE 3-16    Patient Name: Jayleen Soto  Date:2017  : 1955  [x]  Patient  Verified  Payor: RAJENDRA [place] / Plan: [address] [place] [address] / Product Type: PPO /    In time:320  Out time:355  Total Treatment Time (min): 35  Total Timed Codes (min): 35  1:1 Treatment Time (MC only):     Visit #: 12     Treatment Area: [x] Pelvic Floor     [] Other:    SUBJECTIVE  Pain Level (0-10 scale): 1-2/10  Any medication changes, allergies to medications, adverse drug reactions, diagnosis change, or new procedure performed?: [x] No    [] Yes (see summary sheet for update)  Subjective functional status/changes:   [] No changes reported  Pt reports that he is having about 3 bowel movements a day and thinks he is emptying better overall without need to strain. He states that he thinks his pain is mostly from hemrrhoids. Pt reports that he has is not really keeping up with colon massage and decided to opt out of prune juice thinking he was having plenty of bowel movements.     OBJECTIVE    Modality rationale: decrease pain and increase tissue extensibility to improve the patients ability to empty pelvic floor   Min Type Additional Details    [] Estim:  []Unatt       []IFC  []Premod                        []Other:  []w/ice   []w/heat  Position:  Location:    [] Estim: []Att    []TENS instruct  []NMES                    []Other:  []w/US   []w/ice   []w/heat  Position:  Location:    []  Ultrasound: []Continuous   [] Pulsed                           []1MHz   []3MHz Position:  Location:   10 []  Ice     [x]  heat  []  Ice massage  []  Laser   []  Anodyne Position:proneLocation:groin   [x] Skin assessment post-treatment:  [x]intact [x]redness- no adverse reaction    []redness  adverse reaction:            15 min Therapeutic Activity:  [x]  See flow sheet :    [x]  Increase Tissue extensibility        [x]  Assess fiber intake    [x]  Assess voiding habits  [x]  Assess bowel habits  [] Other:relaxed voiding,    Rationale: increase ROM, improve coordination and increase proprioception  to improve the patients ability to relax during voids        20 min Manual Therapy:  Intrarectal MFR to levators and coccygeus   Rationale: decrease pain, increase ROM, increase tissue extensibility, decrease edema  and decrease trigger points to relax pelvic floor          With   [] TE   [x] TA   [] neuro  [x] manual   [] other: Patient Education: [x] Review HEP    [] Progressed/Changed HEP based on:   [] positioning   [] body mechanics   [] transfers   [x] heat/ice application    [] other:      Other Objective/Functional Measures:   PFM 3/5 MMT with limited endurance    Pain Level (0-10 scale) post treatment: 1-2/10    ASSESSMENT/Changes in Function: Pt demonstrates continued myofascial tension through coccygeus and levators however released to some degree but not enough to resolve pain. Pt does demonstrate improved healing with no bleeding noted today.   He continues to have difficulty relaxing on left side more than right.     []  Decrease # of leaks   [] No change []  Improving [] Resolved     [x]  Decrease hypertonus [] No change [x]  Improving [] Resolved     []  Increase void interval [] No change []  Improving [] Resolved     []  Increase PF strength [] No change []  Improving [] Resolved     []  Increase PF endurance [] No change []  Improving [] Resolved     []  Increase endurance [] No change []  Improving [] Resolved     []  Decrease # of pads [] No change []  Improving [] Resolved     [x]  Decrease pain [] No change [x]  Improving [] Resolved     []  Increased coordination [] No change [x]  Improving [] Resolved     []  Increased Bowel Frequency [] No change []  Improving [] Resolved       Patient will continue to benefit from skilled PT services to address functional mobility deficits, address ROM deficits, address strength deficits and analyze and address soft tissue restrictions to attain remaining goals.     []  See Plan of Care  []  See progress note/recertification  []  See Discharge Summary         Progress towards goals / Updated goals:  Short Term Goals: To be accomplished in 4  weeks:  1. Patient will perform Home Exercise Program accurately as adjunct to PT clinic visits to promote healthy lifestyle and improve quality of life. Status @ Eval: to be initiated 2nd visit  Current: PROGRESSING and compliant  2. Patient will demonstrate accurate simulation of proper defecation dynamics with min cues for increased ease of defecation and more normal function.    Status @ eval: discussed benefits of squatty potty  Current: MET: Pt is familiar however continues to strain some  [address] be accomplished in 8 weeks:  1. Patient demonstrate independence in [facility] for maintenance of Pelvic Floor program and improved quality of life. Status @ Eval: to be initiated 2nd visit  Current: continue with program and nearly independent  2. Patient will report bowel regularity no more than 3X/day with minimal to no straining for absence of constipation/decreased abdominal pain and ability to return to pain free ADL activity. Status @ Eval: 3-4x/day with inconsistent straining  Current: 3-x day with less straining-GOAL MET for bowel frequency  3. Patient will demonstrate correct coordination of pelvic floor and abdominal musculature for proper defecation dynamics independently to facilitate more normal defecation/evacuation. Status @ Eval: to be reviewed  CUrrent: Progressing, with improved emptying and reduced strain necessary, pt still challenged with relaxation on left side of pelvic floor  4. Patient will have FOTO score Bowel Constipation change of 12 points indicating improvement in function.   Status @ Eval: 52  Current:     PLAN  []  Upgrade activities as tolerated     []  Continue plan of care  []  Update interventions per flow sheet       []  Discharge due to:_  []  Other:_      Haris Grounds, ESTELLA 12/7/2017  3:17 PM    Future Appointments  Date Time Provider Rosalie Espinal   12/13/2017 3:00 PM Shellie Connor [facility]   12/15/2017 2:15 PM ESTELLA Connor [facility]   12/18/2017 1:30 PM ESTELLA Connor [facility]   12/20/2017 11:45 AM ESTELLA Connor [facility]   12/27/2017 11:15 AM ESTELLA Connor [facility]   12/29/2017 12:30 PM ESTELLA Connor [facility]

## 2017-12-13 ENCOUNTER — HOSPITAL ENCOUNTER (OUTPATIENT)
Dept: PHYSICAL THERAPY | Age: 62
Discharge: HOME | End: 2017-12-13
Payer: COMMERCIAL

## 2017-12-13 PROCEDURE — 97140 MANUAL THERAPY 1/> REGIONS: CPT

## 2017-12-13 PROCEDURE — 97530 THERAPEUTIC ACTIVITIES: CPT

## 2017-12-13 NOTE — PROGRESS NOTES
PF DAILY TREATMENT NOTE 3-16    Patient Name: Adan Villasenor  Date:2017  : 1955  [x]  Patient  Verified  Payor: RAJENDRA [place] / Plan: [address] [place] [address] / Product Type: PPO /    In time:310  Out time:352  Total Treatment Time (min): 42  Total Timed Codes (min): 42  1:1 Treatment Time ( only):     Visit #:     Treatment Area: [x] Pelvic Floor     [] Other:    SUBJECTIVE  Pain Level (0-10 scale): 0-1/10  Any medication changes, allergies to medications, adverse drug reactions, diagnosis change, or new procedure performed?: [x] No    [] Yes (see summary sheet for update)  Subjective functional status/changes:   [] No changes reported  Pt reports that his pain up to 3/10 but has been almost pain free, mostly in the afternoon. He states that he is 70% improved since start and reports emptying large type 4 bowel movements now.   He is still straining with each bowel movement   OBJECTIVE    Modality rationale: decrease pain and increase tissue extensibility to improve the patients ability to relax pelvic floor   Min Type Additional Details    [] Estim:  []Unatt       []IFC  []Premod                        []Other:  []w/ice   []w/heat  Position:  Location:    [] Estim: []Att    []TENS instruct  []NMES                    []Other:  []w/US   []w/ice   []w/heat  Position:  Location:    []  Ultrasound: []Continuous   [] Pulsed                           []1MHz   []3MHz Position:  Location:   10 []  Ice     [x]  heat  []  Ice massage  []  Laser   []  Anodyne Position:proneLocation:groin   [x] Skin assessment post-treatment:  [x]intact [x]redness- no adverse reaction    []redness  adverse reaction:         15 min Therapeutic Activity:  []  See flow sheet :    []  Increase Tissue extensibility        [x]  Assess fiber intake    [x]  Assess voiding habits  [x]  Assess bowel habits  [x]  Other:defecation dynamics, relaxed voiding, nutrition management   Rationale: increase ROM, improve coordination and increase proprioception  to improve the patients ability to empty bowels      17 min Manual Therapy: Intrarectal MFR to levators, coccygeus, pubococcygeus   Rationale: decrease pain, increase ROM, increase tissue extensibility, decrease edema  and decrease trigger points to relax pelvic floor to promote emptying     With   [] TE   [x] TA   [] neuro  [x] manual   [] other: Patient Education: [x] Review HEP    [] Progressed/Changed HEP based on:   [] positioning   [] body mechanics   [] transfers   [x] heat/ice application    [] other: relaxed voiding     Other Objective/Functional Measures:       Pain Level (0-10 scale) post treatment: 0-1/10    ASSESSMENT/Changes in Function: Pt continues to h ave mild pain due to ongoing straining with voiding despite having soft stools that evacuate easily. Pt educated on need to relax during voids at all times to avoid increased pain and ms tension. []  Decrease # of leaks   [] No change []  Improving [] Resolved     []  Decrease hypertonus [] No change [x]  Improving [] Resolved     []  Increase void interval [] No change []  Improving [] Resolved     []  Increase PF strength [] No change []  Improving [] Resolved     []  Increase PF endurance [] No change []  Improving [] Resolved     []  Increase endurance [] No change []  Improving [] Resolved     []  Decrease # of pads [] No change []  Improving [] Resolved     [x]  Decrease pain [] No change [x]  Improving [] Resolved     [x]  Increased coordination [] No change [x]  Improving [] Resolved     []  Increased Bowel Frequency [] No change []  Improving [] Resolved       Patient will continue to benefit from skilled PT services to address functional mobility deficits, address ROM deficits, address strength deficits and analyze and address soft tissue restrictions to attain remaining goals.      []  See Plan of Care  []  See progress note/recertification  []  See Discharge Summary         Progress towards goals / Updated goals:  Short Term Goals: To be accomplished in 4  weeks:  1. Patient will perform Home Exercise Program accurately as adjunct to PT clinic visits to promote healthy lifestyle and improve quality of life. Status @ Eval: to be initiated 2nd visit  Current: PROGRESSING and compliant  2. Patient will demonstrate accurate simulation of proper defecation dynamics with min cues for increased ease of defecation and more normal function.    Status @ eval: discussed benefits of squatty potty  Current: MET: Pt is familiar however continues to strain some  [address] be accomplished in 8 weeks:  1. Patient demonstrate independence in HEP for maintenance of Pelvic Floor program and improved quality of life. Status @ Eval: to be initiated 2nd visit  Current: continue with program and nearly independent  2. Patient will report bowel regularity no more than 3X/day with minimal to no straining for absence of constipation/decreased abdominal pain and ability to return to pain free ADL activity. Status @ Eval: 3-4x/day with inconsistent straining  Current: 3-x day with less straining-GOAL MET for bowel frequency  3. Patient will demonstrate correct coordination of pelvic floor and abdominal musculature for proper defecation dynamics independently to facilitate more normal defecation/evacuation. Status @ Eval: to be reviewed  CUrrent: Progressing, with improved emptying and reduced strain necessary, pt still challenged with relaxation on left side of pelvic floor  4. Patient will have FOTO score Bowel Constipation change of 12 points indicating improvement in function.   Status @ Eval: 47  Current: NEARLY MET 68    PLAN  []  Upgrade activities as tolerated     [x]  Continue plan of care  []  Update interventions per flow sheet       []  Discharge due to:_  []  Other:_      Sigifredo Suárez PTA 12/13/2017  2:07 PM    Future Appointments  Date Time Provider Rosalie Espinal   12/13/2017 3:00 PM Janett Wendy Samuel [facility]   12/15/2017 2:15 PM Day Xie PTA [facility]   12/18/2017 1:30 PM Day Xie PTA [facility]   12/20/2017 11:45 AM Day Xie PTA [facility]   12/27/2017 11:15 AM Day Xie PTA [facility]   12/29/2017 12:30 PM Day Xie PTA [facility]

## 2017-12-14 ENCOUNTER — APPOINTMENT (OUTPATIENT)
Dept: PHYSICAL THERAPY | Age: 62
End: 2017-12-14
Payer: COMMERCIAL

## 2017-12-15 ENCOUNTER — HOSPITAL ENCOUNTER (OUTPATIENT)
Dept: PHYSICAL THERAPY | Age: 62
Discharge: HOME | End: 2017-12-15
Payer: COMMERCIAL

## 2017-12-15 PROCEDURE — 97530 THERAPEUTIC ACTIVITIES: CPT

## 2017-12-15 NOTE — PROGRESS NOTES
PF DAILY TREATMENT NOTE 3-16    Patient Name: Oswald Alvarado  Date:12/15/2017  : 1955  [x]  Patient  Verified  Payor: [employer] [place] / Plan: [address] [place] [address] / Product Type: PPO /    In time:220  Out time:240  Total Treatment Time (min): 20  Total Timed Codes (min): 20   1:1 Treatment Time ([facility] only):     Visit #: 14 of 14    Treatment Area: [] Pelvic Floor     [] Other:    SUBJECTIVE  Pain Level (0-10 scale): 1/10  Any medication changes, allergies to medications, adverse drug reactions, diagnosis change, or new procedure performed?: [x] No    [] Yes (see summary sheet for update)  Subjective functional status/changes:   [] No changes reported  Pt reports that he is 80-85% improved since start of care. He states that he doesn't have any pain with first bowel movement and doesn't need to strain but needs to strain with later bowel movements and has some mild pain then. OBJECTIVE           20 min Therapeutic Activity:  [x]  See flow sheet :    [x]  Increase Tissue extensibility        [x]  Assess fiber intake    [x]  Assess voiding habits  [x]  Assess bowel habits  [x]  Other:review HEP in total. Relaxed voiding   Rationale: increase ROM, improve coordination and increase proprioception  to improve the patients ability to relax plevic floor for improved emptying            With   [] TE   [x] TA   [] neuro  [] manual   [] other: Patient Education: [x] Review HEP    [] Progressed/Changed HEP based on:   [] positioning   [] body mechanics   [] transfers   [x] heat/ice application    [x] other:relaxed voiding      Other Objective/Functional Measures:       Pain Level (0-10 scale) post treatment: 1/10    ASSESSMENT/Changes in Function: Pt has met most goals since start of care with absence of pain except when he strains for bowel movements. Pt encouraged to quit straining to resolve all pain.   Reviewed HEP with patient and encouraged to sit longer with first void to improve overall easy emptying. []  Decrease # of leaks   [] No change []  Improving [] Resolved     [x]  Decrease hypertonus [] No change [x]  Improving [] Resolved     []  Increase void interval [] No change []  Improving [] Resolved     [x]  Increase PF strength [] No change [x]  Improving [] Resolved     []  Increase PF endurance [] No change []  Improving [] Resolved     []  Increase endurance [] No change []  Improving [] Resolved     []  Decrease # of pads [] No change []  Improving [] Resolved     [x]  Decrease pain [] No change [x]  Improving [] Resolved     [x]  Increased coordination [] No change [x]  Improving [] Resolved     []  Increased Bowel Frequency [] No change []  Improving [] Resolved       Patient will continue to benefit from skilled PT services to address functional mobility deficits, address ROM deficits, address strength deficits and analyze and address soft tissue restrictions to attain remaining goals. []  See Plan of Care  []  See progress note/recertification  []  See Discharge Summary         Progress towards goals / Updated goals:  Short Term Goals: To be accomplished in 4  weeks:  1. Patient will perform Home Exercise Program accurately as adjunct to PT clinic visits to promote healthy lifestyle and improve quality of life. Status @ Eval: to be initiated 2nd visit  Current: MET and compliant  2. Patient will demonstrate accurate simulation of proper defecation dynamics with min cues for increased ease of defecation and more normal function.    Status @ eval: discussed benefits of squatty potty  Current: MET: Pt is familiar however continues to strain some  [address] be accomplished in 8 weeks:  1. Patient demonstrate independence in HEP for maintenance of Pelvic Floor program and improved quality of life. Status @ Eval: to be initiated 2nd visit  Current: MET with program and  independent  2.  Patient will report bowel regularity no more than 3X/day with minimal to no straining for absence of constipation/decreased abdominal pain and ability to return to pain free ADL activity. Status @ Eval: 3-4x/day with inconsistent straining  Current: 3-x day with less straining-GOAL MET for bowel frequency  3. Patient will demonstrate correct coordination of pelvic floor and abdominal musculature for proper defecation dynamics independently to facilitate more normal defecation/evacuation. Status @ Eval: to be reviewed  CUrrent: MET with improved emptying and reduced strain necessary, pt still challenged with relaxation on left side of pelvic floor  4. Patient will have FOTO score Bowel Constipation change of 12 points indicating improvement in function.   Status @ Eval: 47  Current: NEARLY MET 68    Functional Gains: improved emptying, improved ease with emptying  Bowels, improved defecation dynamics  Functional Deficits: mild pain, mild straining  % improvement: 80-85%  Pain   Average: 1/10       Best: 0/10     Worst: 3/10  Patient Goal: \"pain free bowel movements\"  PLAN  []  Upgrade activities as tolerated     []  Continue plan of care  []  Update interventions per flow sheet       [x]  Discharge due to:_  []  Other:_      Kasey Dewitt PTA 12/15/2017  2:25 PM    Future Appointments  Date Time Provider Rosalie Espinal   12/18/2017 1:30 PM Pat MICHEL [facility]   12/20/2017 11:45 AM ESTELLA Chapa [facility]   12/27/2017 11:15 AM ESTELLA Chapa [facility]   12/29/2017 12:30 PM ESTELLA Chapa [facility]

## 2017-12-15 NOTE — PROGRESS NOTES
[facility] at [address] [place] [address]  Phone: [phone]   Fax: [phone]    Discharge Summary    Patient name: Kunal Monk     Start of Care: 2017  Referral source: Juliette Shah DO    : 1955  Medical/Treatment Diagnosis: Constipation [K59.00]  Onset Date:2017 (date of surgery)  Prior Hospitalization: see medical history   Provider#: [id number]    Medications: Verified on Patient Summary List  Comorbidities: hemorrhoids   Prior Level of Function: patient was independent with all ADLs and activities however now has decreased quality of life secondary to bowel function/pain  Visits from Start of Care: 14    Missed Visits: 0  Reporting Period : 2017 to 12/15/2017    Short Term Goals: To be accomplished in 4  weeks:  1. Patient will perform Home Exercise Program accurately as adjunct to PT clinic visits to promote healthy lifestyle and improve quality of life. Status @ Eval: to be initiated 2nd visit  Current: MET and compliant  2. Patient will demonstrate accurate simulation of proper defecation dynamics with min cues for increased ease of defecation and more normal function.    Status @ eval: discussed benefits of squatty potty  Current: MET: Pt is familiar however continues to strain some  [address] be accomplished in 8 weeks:  1. Patient demonstrate independence in HEP for maintenance of Pelvic Floor program and improved quality of life. Status @ Eval: to be initiated 2nd visit  Current: MET with program and  independent  2. Patient will report bowel regularity no more than 3X/day with minimal to no straining for absence of constipation/decreased abdominal pain and ability to return to pain free ADL activity. Status @ Eval: 3-4x/day with inconsistent straining  Current: 3-x day with less straining-GOAL MET for bowel frequency  3.  Patient will demonstrate correct coordination of pelvic floor and abdominal musculature for proper defecation dynamics independently to facilitate more normal defecation/evacuation. Status @ Eval: to be reviewed  CUrrent: MET with improved emptying and reduced strain necessary, pt still challenged with relaxation on left side of pelvic floor  4. Patient will have FOTO score Bowel Constipation change of 12 points indicating improvement in function. Status @ Eval: 47  Current: NEARLY MET 68     Functional Gains: improved emptying, improved ease with emptying  Bowels, improved defecation dynamics  Functional Deficits: mild pain, mild straining  % improvement: 80-85%  Pain   Average: 1/10                            Best: 0/10                          Worst: 3/10  Patient Goal: \"pain free bowel movements\"      Assessment/ Summary of Care:   Patient has met goals set and is ready for discharge at this time. Patient is independent and compliant with HEP. No further skilled PT services indicated at this time.      RECOMMENDATIONS:  [x]Discontinue therapy: [x]Patient has reached or is progressing toward set goals      []Patient is non-compliant or has abdicated      []Due to lack of appreciable progress towards set goals    Siomara Bryan 12/15/2017 3:45 PM

## 2017-12-18 ENCOUNTER — APPOINTMENT (OUTPATIENT)
Dept: PHYSICAL THERAPY | Age: 62
End: 2017-12-18
Payer: COMMERCIAL

## 2017-12-20 ENCOUNTER — APPOINTMENT (OUTPATIENT)
Dept: PHYSICAL THERAPY | Age: 62
End: 2017-12-20
Payer: COMMERCIAL

## 2017-12-21 ENCOUNTER — APPOINTMENT (OUTPATIENT)
Dept: PHYSICAL THERAPY | Age: 62
End: 2017-12-21
Payer: COMMERCIAL

## 2017-12-27 ENCOUNTER — APPOINTMENT (OUTPATIENT)
Dept: PHYSICAL THERAPY | Age: 62
End: 2017-12-27
Payer: COMMERCIAL

## 2017-12-29 ENCOUNTER — APPOINTMENT (OUTPATIENT)
Dept: PHYSICAL THERAPY | Age: 62
End: 2017-12-29
Payer: COMMERCIAL

## 2024-04-24 ENCOUNTER — APPOINTMENT (OUTPATIENT)
Facility: HOSPITAL | Age: 69
End: 2024-04-24
Payer: MEDICARE

## 2024-04-24 ENCOUNTER — HOSPITAL ENCOUNTER (EMERGENCY)
Facility: HOSPITAL | Age: 69
Discharge: HOME | End: 2024-04-24
Attending: EMERGENCY MEDICINE
Payer: MEDICARE

## 2024-04-24 VITALS
OXYGEN SATURATION: 100 % | DIASTOLIC BLOOD PRESSURE: 81 MMHG | HEIGHT: 71 IN | HEART RATE: 83 BPM | SYSTOLIC BLOOD PRESSURE: 135 MMHG | TEMPERATURE: 98 F | RESPIRATION RATE: 12 BRPM | WEIGHT: 180 LBS | BODY MASS INDEX: 25.2 KG/M2

## 2024-04-24 DIAGNOSIS — R07.9 CHEST PAIN, UNSPECIFIED TYPE: Primary | ICD-10-CM

## 2024-04-24 LAB
ALBUMIN SERPL-MCNC: 3.7 G/DL (ref 3.4–5)
ALBUMIN/GLOB SERPL: 1 (ref 0.8–1.7)
ALP SERPL-CCNC: 55 U/L (ref 45–117)
ALT SERPL-CCNC: 36 U/L (ref 16–61)
ANION GAP SERPL CALC-SCNC: 9 MMOL/L (ref 3–18)
AST SERPL-CCNC: 27 U/L (ref 10–38)
BASOPHILS # BLD: 0 K/UL (ref 0–0.1)
BASOPHILS NFR BLD: 1 % (ref 0–2)
BILIRUB SERPL-MCNC: 0.9 MG/DL (ref 0.2–1)
BUN SERPL-MCNC: 12 MG/DL (ref 7–18)
BUN/CREAT SERPL: 13 (ref 12–20)
CALCIUM SERPL-MCNC: 9.2 MG/DL (ref 8.5–10.1)
CHLORIDE SERPL-SCNC: 98 MMOL/L (ref 100–111)
CO2 SERPL-SCNC: 26 MMOL/L (ref 21–32)
CREAT SERPL-MCNC: 0.93 MG/DL (ref 0.6–1.3)
DIFFERENTIAL METHOD BLD: ABNORMAL
EOSINOPHIL # BLD: 0.1 K/UL (ref 0–0.4)
EOSINOPHIL NFR BLD: 2 % (ref 0–5)
ERYTHROCYTE [DISTWIDTH] IN BLOOD BY AUTOMATED COUNT: 12 % (ref 11.6–14.5)
GLOBULIN SER CALC-MCNC: 3.8 G/DL (ref 2–4)
GLUCOSE SERPL-MCNC: 108 MG/DL (ref 74–99)
HCT VFR BLD AUTO: 41.8 % (ref 36–48)
HGB BLD-MCNC: 14.8 G/DL (ref 13–16)
IMM GRANULOCYTES # BLD AUTO: 0.1 K/UL (ref 0–0.04)
IMM GRANULOCYTES NFR BLD AUTO: 1 % (ref 0–0.5)
LYMPHOCYTES # BLD: 1.3 K/UL (ref 0.9–3.6)
LYMPHOCYTES NFR BLD: 22 % (ref 21–52)
MAGNESIUM SERPL-MCNC: 1.7 MG/DL (ref 1.6–2.6)
MCH RBC QN AUTO: 32.6 PG (ref 24–34)
MCHC RBC AUTO-ENTMCNC: 35.4 G/DL (ref 31–37)
MCV RBC AUTO: 92.1 FL (ref 78–100)
MONOCYTES # BLD: 0.8 K/UL (ref 0.05–1.2)
MONOCYTES NFR BLD: 14 % (ref 3–10)
NEUTS SEG # BLD: 3.6 K/UL (ref 1.8–8)
NEUTS SEG NFR BLD: 61 % (ref 40–73)
NRBC # BLD: 0 K/UL (ref 0–0.01)
NRBC BLD-RTO: 0 PER 100 WBC
PLATELET # BLD AUTO: 253 K/UL (ref 135–420)
PMV BLD AUTO: 8.5 FL (ref 9.2–11.8)
POTASSIUM SERPL-SCNC: 3.6 MMOL/L (ref 3.5–5.5)
PROT SERPL-MCNC: 7.5 G/DL (ref 6.4–8.2)
RBC # BLD AUTO: 4.54 M/UL (ref 4.35–5.65)
SODIUM SERPL-SCNC: 133 MMOL/L (ref 136–145)
TROPONIN I SERPL HS-MCNC: 10 NG/L (ref 0–78)
TROPONIN I SERPL HS-MCNC: 9 NG/L (ref 0–78)
WBC # BLD AUTO: 5.9 K/UL (ref 4.6–13.2)

## 2024-04-24 PROCEDURE — 80053 COMPREHEN METABOLIC PANEL: CPT

## 2024-04-24 PROCEDURE — 84484 ASSAY OF TROPONIN QUANT: CPT

## 2024-04-24 PROCEDURE — 99285 EMERGENCY DEPT VISIT HI MDM: CPT

## 2024-04-24 PROCEDURE — 85025 COMPLETE CBC W/AUTO DIFF WBC: CPT

## 2024-04-24 PROCEDURE — 6370000000 HC RX 637 (ALT 250 FOR IP): Performed by: EMERGENCY MEDICINE

## 2024-04-24 PROCEDURE — 83735 ASSAY OF MAGNESIUM: CPT

## 2024-04-24 PROCEDURE — 71045 X-RAY EXAM CHEST 1 VIEW: CPT

## 2024-04-24 PROCEDURE — 94762 N-INVAS EAR/PLS OXIMTRY CONT: CPT

## 2024-04-24 PROCEDURE — 93005 ELECTROCARDIOGRAM TRACING: CPT | Performed by: EMERGENCY MEDICINE

## 2024-04-24 RX ORDER — ASPIRIN 81 MG/1
162 TABLET, CHEWABLE ORAL
Status: COMPLETED | OUTPATIENT
Start: 2024-04-24 | End: 2024-04-24

## 2024-04-24 RX ADMIN — ASPIRIN 162 MG: 81 TABLET, CHEWABLE ORAL at 09:34

## 2024-04-24 ASSESSMENT — PAIN - FUNCTIONAL ASSESSMENT: PAIN_FUNCTIONAL_ASSESSMENT: NONE - DENIES PAIN

## 2024-04-24 NOTE — ED PROVIDER NOTES
section below.     Interpretation per the Radiologist is listed below, if available at the time of this note:    XR CHEST PORTABLE   Final Result      No acute process on portable chest.             Procedures     Procedures    ED Course     9:27 AM EDT I (Shen Willson MD) am the first provider for this patient. Initial assessment performed. I reviewed the vital signs, available nursing notes, past medical history, past surgical history, family history and social history. The patients presenting problems have been discussed, and they are in agreement with the care plan formulated and outlined with them.  I have encouraged them to ask questions as they arise throughout their visit.    RECORDS REVIEWED: Nursing Notes    MEDICATIONS ADMINISTERED IN THE ED:  Medications   aspirin chewable tablet 162 mg (162 mg Oral Given 24 0934)       ED Course as of 24 1201      0919 EKG interpretation:  Rhythm: NSR with PVCs. Rate: 92 bpm; No STEMI  EKG read by Shen Willson MD [JM]   1045 EKG interpretation:  Rhythm: NSR. Rate: 78 bpm; No STEMI  EKG read by Shen Willson MD []   1131 RADIOLOGY FINDINGS  Chest X-ray shows no acute cardiopulmonary process.  Interpreted by Shen Willson MD.  Confirmed by Radiologist [JM]      ED Course User Index  [JM] Shen Willson MD       None    Medical Decision Making     SCREENING TOOLS:  HEART Score:    History:  0: Slightly Suspicious  EK: Normal  Age:  2: Over 65  Risk Factors:  1: 1-2  Hypertension and Hyperlipidemia  Troponin 1: 10 ng/L  Troponin 2: 9 ng/L    Total: 3  0-3: Low risk: less than 2% risk of Major Adverse Cardiac Event at 6 weeks.  Troponin increase by 1.4x or more: No    MDM:  I utilized an evidence-based risk rating tool (CMT) along with my training and experience to weigh the risk of discharge against the risks of further testing, imaging, or hospitalization. At this time I estimate the risks of additional testing, imaging,

## 2024-04-24 NOTE — ED TRIAGE NOTES
Patient reports he has been having chest pain on and off for a week. Has irregular heart beat and b/p is elevated

## 2024-04-24 NOTE — DISCHARGE INSTRUCTIONS
Follow-up with cardiology  for further testing and risk stratification.  You will need to call to make an appointment.  Follow-up with your primary care doctor or for other concerns.  As well.  Return to the ED for worsening symptoms

## 2024-04-25 LAB
EKG ATRIAL RATE: 78 BPM
EKG ATRIAL RATE: 92 BPM
EKG DIAGNOSIS: NORMAL
EKG DIAGNOSIS: NORMAL
EKG P AXIS: 74 DEGREES
EKG P AXIS: 78 DEGREES
EKG P-R INTERVAL: 158 MS
EKG P-R INTERVAL: 160 MS
EKG Q-T INTERVAL: 340 MS
EKG Q-T INTERVAL: 352 MS
EKG QRS DURATION: 68 MS
EKG QRS DURATION: 72 MS
EKG QTC CALCULATION (BAZETT): 401 MS
EKG QTC CALCULATION (BAZETT): 420 MS
EKG R AXIS: 29 DEGREES
EKG R AXIS: 43 DEGREES
EKG T AXIS: 91 DEGREES
EKG T AXIS: 99 DEGREES
EKG VENTRICULAR RATE: 78 BPM
EKG VENTRICULAR RATE: 92 BPM

## 2024-08-10 ENCOUNTER — HOSPITAL ENCOUNTER (EMERGENCY)
Facility: HOSPITAL | Age: 69
Discharge: HOME | End: 2024-08-10
Attending: EMERGENCY MEDICINE
Payer: MEDICARE

## 2024-08-10 VITALS
DIASTOLIC BLOOD PRESSURE: 62 MMHG | BODY MASS INDEX: 25.2 KG/M2 | HEIGHT: 71 IN | TEMPERATURE: 98.1 F | OXYGEN SATURATION: 100 % | WEIGHT: 180 LBS | RESPIRATION RATE: 16 BRPM | SYSTOLIC BLOOD PRESSURE: 126 MMHG | HEART RATE: 77 BPM

## 2024-08-10 DIAGNOSIS — S91.112A LACERATION OF LEFT GREAT TOE WITHOUT FOREIGN BODY PRESENT OR DAMAGE TO NAIL, INITIAL ENCOUNTER: Primary | ICD-10-CM

## 2024-08-10 PROCEDURE — 99283 EMERGENCY DEPT VISIT LOW MDM: CPT

## 2024-08-10 PROCEDURE — 12001 RPR S/N/AX/GEN/TRNK 2.5CM/<: CPT

## 2024-08-10 RX ORDER — TETANUS AND DIPHTHERIA TOXOIDS ADSORBED 2; 2 [LF]/.5ML; [LF]/.5ML
0.5 INJECTION INTRAMUSCULAR ONCE
Status: DISCONTINUED | OUTPATIENT
Start: 2024-08-10 | End: 2024-08-10

## 2024-08-10 NOTE — ED NOTES
RN presents to bedside with dermabond and and boostrix. Pt states that he does not want the tetanus vaccination. MD notified.

## 2024-08-10 NOTE — ED PROVIDER NOTES
[facility] EMERGENCY DEPT  EMERGENCY DEPARTMENT HISTORY AND PHYSICAL EXAM      Date: 8/10/2024  Patient Name: Joseph Alvarado  MRN: [id number]  Birthdate 1955  Date of evaluation: 8/10/2024  Provider: Constantine Castrejon MD   Note Started: 2:16 AM EDT 8/10/24    HISTORY OF PRESENT ILLNESS     Chief Complaint   Patient presents with    Laceration     Patient states that he does not know what happened but he believes he possibly stepped on a nail. Patient has a puncture on his left big toe. Patient denies any alcohol or drugs use. Patient is bleeding during triage. I will dress the laceration now        History Provided By: Patient    HPI: Joseph Alvarado is a 68 y.o. male with a history of hypertension hyperlipidemia presenting with left great toe wound.  Patient states that he was just sitting on a cement porch and not sure what exactly happened but noticed that he was bleeding from his toe.  Injured it somehow.  Tetanus not up-to-date.    PAST MEDICAL HISTORY   Past Medical History:  Past Medical History:   Diagnosis Date    Hyperlipidemia     Hypertension        Past Surgical History:  No past surgical history on file.    Family History:  No family history on file.    Social History:  Social History     Tobacco Use    Smoking status: Never   Substance Use Topics    Alcohol use: Yes     Comment: 2 drinks a day    Drug use: Never       Allergies:  Allergies   Allergen Reactions    Levaquin [Levofloxacin] Anaphylaxis       PCP: Koffi Aldrich DO    Current Meds:   No current facility-administered medications for this encounter.     No current outpatient medications on file.       Social Determinants of Health:   Social Determinants of Health     Tobacco Use: Unknown (4/24/2024)    Patient History     Smoking Tobacco Use: Never     Smokeless Tobacco Use: Unknown     Passive Exposure: Not on file   Alcohol Use: Not on file   Financial Resource Strain: Not on file   Food Insecurity: Not on file   Transportation Needs:  Not on file   Physical Activity: Not on file   Stress: Not on file   Social Connections: Not on file   Intimate Partner Violence: Not on file   Depression: Not on file   Housing Stability: Not on file   Interpersonal Safety: Not At Risk (4/24/2024)    Interpersonal Safety Domain Source: IP Abuse Screening     Physical abuse: Denies     Verbal abuse: Denies     Emotional abuse: Denies     Financial abuse: Denies     Sexual abuse: Denies   Utilities: Not on file       PHYSICAL EXAM   Physical Exam  Vitals and nursing note reviewed.   HENT:      Head: Normocephalic.      Nose: Nose normal.      Mouth/Throat:      Mouth: Mucous membranes are moist.   Eyes:      Extraocular Movements: Extraocular movements intact.   Cardiovascular:      Comments: Well perfused  Pulmonary:      Effort: Pulmonary effort is normal.   Musculoskeletal:         General: Normal range of motion.      Cervical back: Normal range of motion.   Skin:     Comments: Over the plantar surface of patient's left great toe near the crease there is a 2 cm laceration with no more bleeding after compression dressing.  Able to wiggle the toe and move the joint.  Good capillary refill and sensation   Neurological:      General: No focal deficit present.      Mental Status: He is alert. Mental status is at baseline.   Psychiatric:         Mood and Affect: Mood normal.         SCREENINGS                No data recorded    LAB, EKG AND DIAGNOSTIC RESULTS   Labs:  No results found for this or any previous visit (from the past 12 hour(s)).    EKG:.Not Applicable    Radiologic Studies:  Non-plain film images such as CT, Ultrasound and MRI are read by the radiologist. Plain radiographic images are visualized and preliminarily interpreted by the ED Provider with the following findings: Not Applicable.    Interpretation per the Radiologist below, if available at the time of this note:  No orders to display        ED COURSE and DIFFERENTIAL DIAGNOSIS/MDM   2:36 AM

## (undated) DEVICE — AIRLIFE™ NASAL OXYGEN CANNULA CURVED, FLARED TIP WITH 14 FOOT (4.3 M) CRUSH-RESISTANT TUBING, OVER-THE-EAR STYLE: Brand: AIRLIFE™

## (undated) DEVICE — ENDO CARRY-ON PROCEDURE KIT INCLUDES ENZYMATIC SPONGE, GAUZE, BIOHAZARD LABEL, TRAY, LUBRICANT, DIRTY SCOPE LABEL, WATER LABEL, TRAY, DRAWSTRING PAD, AND DEFENDO 4-PIECE KIT.: Brand: ENDO CARRY-ON PROCEDURE KIT

## (undated) DEVICE — ALL PURPOSE SPONGES,NON-WOVEN, 4 PLY: Brand: CURITY

## (undated) DEVICE — KENDALL RADIOLUCENT FOAM MONITORING ELECTRODE RECTANGULAR SHAPE: Brand: KENDALL

## (undated) DEVICE — SET ADMIN 16ML TBNG L100IN 2 Y INJ SITE IV PIGGY BK DISP

## (undated) DEVICE — NDL PRT INJ NSAF BLNT 18GX1.5 --

## (undated) DEVICE — CATH IV SAFE STR 22GX1IN BLU -- PROTECTIV PLUS

## (undated) DEVICE — SYR 50ML SLIP TIP NSAF LF STRL --

## (undated) DEVICE — BRUSH CYTO L240CM DIA2.3MM GI COLONOSCOPY 3 RNG HNDL RADPQ

## (undated) DEVICE — MAJ-1414 SINGLE USE ADPATER BIOPSY VALV: Brand: SINGLE USE ADAPTOR BIOPSY VALVE

## (undated) DEVICE — SYR 3ML LL TIP 1/10ML GRAD --

## (undated) DEVICE — TRNQT TEXT 1X18IN BLU LF DISP -- CONVERT TO ITEM 362165

## (undated) DEVICE — FORCEPS BX CAP 240CM L RAD JAW 4

## (undated) DEVICE — EJECTOR SALIVA 6 IN FLX CLR

## (undated) DEVICE — 4-PORT MANIFOLD: Brand: NEPTUNE 2

## (undated) DEVICE — SINGLE PORT MANIFOLD: Brand: NEPTUNE 2

## (undated) DEVICE — Device

## (undated) DEVICE — TRAP SPEC COLL POLYP POLYSTYR --

## (undated) DEVICE — MOUTHPIECE ENDOSCP 20X27MM --